# Patient Record
Sex: MALE | Race: WHITE | NOT HISPANIC OR LATINO | Employment: FULL TIME | URBAN - METROPOLITAN AREA
[De-identification: names, ages, dates, MRNs, and addresses within clinical notes are randomized per-mention and may not be internally consistent; named-entity substitution may affect disease eponyms.]

---

## 2017-01-11 ENCOUNTER — OFFICE VISIT (OUTPATIENT)
Dept: FAMILY MEDICINE | Facility: CLINIC | Age: 62
End: 2017-01-11
Payer: COMMERCIAL

## 2017-01-11 VITALS
DIASTOLIC BLOOD PRESSURE: 76 MMHG | HEIGHT: 72 IN | HEART RATE: 66 BPM | TEMPERATURE: 97.3 F | WEIGHT: 199 LBS | BODY MASS INDEX: 26.95 KG/M2 | OXYGEN SATURATION: 97 % | SYSTOLIC BLOOD PRESSURE: 117 MMHG

## 2017-01-11 DIAGNOSIS — I10 ESSENTIAL HYPERTENSION: ICD-10-CM

## 2017-01-11 DIAGNOSIS — K76.89 LIVER CYST: ICD-10-CM

## 2017-01-11 DIAGNOSIS — Z00.00 ENCOUNTER FOR ROUTINE ADULT HEALTH EXAMINATION WITHOUT ABNORMAL FINDINGS: ICD-10-CM

## 2017-01-11 DIAGNOSIS — E78.5 HYPERLIPIDEMIA LDL GOAL <130: ICD-10-CM

## 2017-01-11 DIAGNOSIS — Z12.11 COLON CANCER SCREENING: Primary | ICD-10-CM

## 2017-01-11 LAB
BASOPHILS # BLD AUTO: 0 10E9/L (ref 0–0.2)
BASOPHILS NFR BLD AUTO: 0.2 %
DIFFERENTIAL METHOD BLD: NORMAL
EOSINOPHIL # BLD AUTO: 0.1 10E9/L (ref 0–0.7)
EOSINOPHIL NFR BLD AUTO: 1.3 %
ERYTHROCYTE [DISTWIDTH] IN BLOOD BY AUTOMATED COUNT: 13.8 % (ref 10–15)
HCT VFR BLD AUTO: 48.1 % (ref 40–53)
HGB BLD-MCNC: 15.9 G/DL (ref 13.3–17.7)
LYMPHOCYTES # BLD AUTO: 1.6 10E9/L (ref 0.8–5.3)
LYMPHOCYTES NFR BLD AUTO: 27.6 %
MCH RBC QN AUTO: 31.2 PG (ref 26.5–33)
MCHC RBC AUTO-ENTMCNC: 33.1 G/DL (ref 31.5–36.5)
MCV RBC AUTO: 94 FL (ref 78–100)
MONOCYTES # BLD AUTO: 0.5 10E9/L (ref 0–1.3)
MONOCYTES NFR BLD AUTO: 8.9 %
NEUTROPHILS # BLD AUTO: 3.7 10E9/L (ref 1.6–8.3)
NEUTROPHILS NFR BLD AUTO: 62 %
PLATELET # BLD AUTO: 194 10E9/L (ref 150–450)
RBC # BLD AUTO: 5.1 10E12/L (ref 4.4–5.9)
WBC # BLD AUTO: 5.9 10E9/L (ref 4–11)

## 2017-01-11 PROCEDURE — 36415 COLL VENOUS BLD VENIPUNCTURE: CPT | Performed by: PREVENTIVE MEDICINE

## 2017-01-11 PROCEDURE — 80053 COMPREHEN METABOLIC PANEL: CPT | Performed by: PREVENTIVE MEDICINE

## 2017-01-11 PROCEDURE — G0103 PSA SCREENING: HCPCS | Performed by: PREVENTIVE MEDICINE

## 2017-01-11 PROCEDURE — 85025 COMPLETE CBC W/AUTO DIFF WBC: CPT | Performed by: PREVENTIVE MEDICINE

## 2017-01-11 PROCEDURE — 80061 LIPID PANEL: CPT | Mod: 90 | Performed by: PREVENTIVE MEDICINE

## 2017-01-11 PROCEDURE — 80061 LIPID PANEL: CPT | Mod: 59 | Performed by: PREVENTIVE MEDICINE

## 2017-01-11 PROCEDURE — 83704 LIPOPROTEIN BLD QUAN PART: CPT | Mod: 90 | Performed by: PREVENTIVE MEDICINE

## 2017-01-11 PROCEDURE — 99000 SPECIMEN HANDLING OFFICE-LAB: CPT | Performed by: PREVENTIVE MEDICINE

## 2017-01-11 PROCEDURE — 99214 OFFICE O/P EST MOD 30 MIN: CPT | Performed by: PREVENTIVE MEDICINE

## 2017-01-11 NOTE — NURSING NOTE
Chief Complaint   Patient presents with     Recheck Medication     last appt 11/12/15       Initial Pulse 66  Temp(Src) 97.3  F (36.3  C)  Ht 6' (1.829 m)  Wt 199 lb (90.266 kg)  BMI 26.98 kg/m2  SpO2 97% Estimated body mass index is 26.98 kg/(m^2) as calculated from the following:    Height as of this encounter: 6' (1.829 m).    Weight as of this encounter: 199 lb (90.266 kg).  BP completed using cuff size: barb NEFF, CMA

## 2017-01-11 NOTE — PROGRESS NOTES
SUBJECTIVE:  Scott Lynn, a 61 year old male scheduled an appointment to discuss the following issues:     Colon cancer screening  Liver cyst  Hyperlipidemia LDL goal <130  Essential hypertension  Encounter for routine adult health examination without abnormal findings  Pt here for follow up feels fine    Medical, social, surgical, and family histories reviewed.    ROS:  C: NEGATIVE for fever, chills  E: NEGATIVE for vision changes   R: NEGATIVE for significant cough or SOB  CV: NEGATIVE for chest pain, palpitations   GI: NEGATIVE for nausea, abdominal pain, heartburn, or change in bowel habits  : NEGATIVE for frequency, dysuria, or hematuria  M: NEGATIVE for significant arthralgias or myalgia  N: NEGATIVE for weakness, dizziness or paresthesias or headache    OBJECTIVE:  /76 mmHg  Pulse 66  Temp(Src) 97.3  F (36.3  C)  Ht 6' (1.829 m)  Wt 199 lb (90.266 kg)  BMI 26.98 kg/m2  SpO2 97%  EXAM:  GENERAL APPEARANCE: healthy, alert and no distress  EYES: EOMI,  PERRL  HENT: ear canals and TM's normal and nose and mouth without ulcers or lesions  RESP: lungs clear to auscultation - no rales, rhonchi or wheezes  CV: regular rates and rhythm, normal S1 S2, no S3 or S4 and no murmur, click or rub -  ABDOMEN:  soft, nontender, no HSM or masses and bowel sounds normal    ASSESSMENT/PLAN:  (Z12.11) Colon cancer screening  (primary encounter diagnosis)  Plan: GASTROENTEROLOGY ADULT REFERRAL +/- PROCEDURE    (K76.89) Liver cyst  Plan: MR Abdomen w/o & w Contrast  Will recheck with mri    (E78.5) Hyperlipidemia LDL goal <130  Plan: Lipid panel reflex to direct LDL, Lipoprotein         Particle NMR Profile  Will do nmr particle count to further risk stratify    (I10) Essential hypertension  Stable, continue lisinopril 10 mg daily    (Z00.00) Encounter for routine adult health examination without abnormal findings  Plan: CBC with platelets and differential,         Comprehensive metabolic panel, PSA, screen    Med  changes pending labs    25 minutes spent with patient, over 50% time counseling, coordinating care and explaining about nature of the patient's conditions.    All risks, benefits of treatment and further evaluation was reviewed with patient.  Pt expressed understanding.  Pt was in agreement with this plan.  Aashish Friedman MD

## 2017-01-11 NOTE — MR AVS SNAPSHOT
After Visit Summary   1/11/2017    Scott Lynn    MRN: [id number]           Patient Information     Date Of Birth          1955        Visit Information        Provider Department      1/11/2017 12:00 PM [facility] MD [facility]         Follow-ups after your visit        Who to contact     If you have questions or need follow up information about today's clinic visit or your schedule please contact [facility] directly at [phone].  Normal or non-critical lab and imaging results will be communicated to you by Tangible Playhart, letter or phone within 4 business days after the clinic has received the results. If you do not hear from us within 7 days, please contact the clinic through Tangible Playhart or phone. If you have a critical or abnormal lab result, we will notify you by phone as soon as possible.  Submit refill requests through Looker or call your pharmacy and they will forward the refill request to us. Please allow 3 business days for your refill to be completed.          Additional Information About Your Visit        MyChart Information     Looker gives you secure access to your electronic health record. If you see a primary care provider, you can also send messages to your care team and make appointments. If you have questions, please call your primary care clinic.  If you do not have a primary care provider, please call [phone] and they will assist you.        Care EveryWhere ID     This is your Care EveryWhere ID. This could be used by other organizations to access your [facility] medical records  [id number]        Your Vitals Were     Pulse Temperature Height BMI (Body Mass Index) Pulse Oximetry       66 97.3  F (36.3  C) 6' (1.829 m) 26.98 kg/m2 97%        Blood Pressure from Last 3 Encounters:   01/11/17 117/76   11/12/15 121/84   10/23/15 123/86    Weight from Last 3 Encounters:   01/11/17 199 lb (90.266 kg)   11/12/15 202 lb (91.627 kg)    10/23/15 202 lb 11.2 oz (91.944 kg)              Today, you had the following     No orders found for display       Primary Care Provider Office Phone # Fax #    Aashish Jimmy Friedman [phone]       [facility] [address] [facility] [address]  [facility] [address]        Thank you!     Thank you for choosing [facility]  for your care. Our goal is always to provide you with excellent care. Hearing back from our patients is one way we can continue to improve our services. Please take a few minutes to complete the written survey that you may receive in the mail after your visit with us. Thank you!             Your Updated Medication List - Protect others around you: Learn how to safely use, store and throw away your medicines at www.disposemymeds.org.          This list is accurate as of: 1/11/17 12:12 PM.  Always use your most recent med list.                   Brand Name Dispense Instructions for use    clotrimazole 1 % cream    LOTRIMIN    113 g    Apply topically 2 times daily       lisinopril 10 MG tablet    PRINIVIL/ZESTRIL    30 tablet    Take 1 tablet (10 mg) by mouth daily       Multi-vitamin Tabs tablet   Generic drug:  multivitamin, therapeutic with minerals      Take 1 tablet by mouth daily.       olopatadine HCl 0.2 % Soln    PATADAY    2.5 mL    Place 1 drop into both eyes daily       triamcinolone 0.1 % ointment    KENALOG    454 g    Apply topically 2 times daily

## 2017-01-12 LAB
ALBUMIN SERPL-MCNC: 3.9 G/DL (ref 3.4–5)
ALP SERPL-CCNC: 54 U/L (ref 40–150)
ALT SERPL W P-5'-P-CCNC: 38 U/L (ref 0–70)
ANION GAP SERPL CALCULATED.3IONS-SCNC: 6 MMOL/L (ref 3–14)
AST SERPL W P-5'-P-CCNC: 20 U/L (ref 0–45)
BILIRUB SERPL-MCNC: 0.8 MG/DL (ref 0.2–1.3)
BUN SERPL-MCNC: 19 MG/DL (ref 7–30)
CALCIUM SERPL-MCNC: 9.1 MG/DL (ref 8.5–10.1)
CHLORIDE SERPL-SCNC: 103 MMOL/L (ref 94–109)
CHOLEST SERPL-MCNC: 249 MG/DL
CO2 SERPL-SCNC: 30 MMOL/L (ref 20–32)
CREAT SERPL-MCNC: 1 MG/DL (ref 0.66–1.25)
GFR SERPL CREATININE-BSD FRML MDRD: 76 ML/MIN/1.7M2
GLUCOSE SERPL-MCNC: 95 MG/DL (ref 70–99)
HDLC SERPL-MCNC: 57 MG/DL
LDLC SERPL CALC-MCNC: 158 MG/DL
NONHDLC SERPL-MCNC: 192 MG/DL
POTASSIUM SERPL-SCNC: 4.1 MMOL/L (ref 3.4–5.3)
PROT SERPL-MCNC: 7.3 G/DL (ref 6.8–8.8)
PSA SERPL-ACNC: 0.26 UG/L (ref 0–4)
SODIUM SERPL-SCNC: 139 MMOL/L (ref 133–144)
TRIGL SERPL-MCNC: 169 MG/DL

## 2017-01-16 LAB
CHOLEST SERPL-MCNC: 255 MG/DL
HDL SERPL QN: 8.7 NM
HDLC SERPL-MCNC: 59 MG/DL
HLD.LARGE SERPL-SCNC: 38.3 UMOL/L
HLD.LARGE SERPL-SCNC: 4.7 UMOL/L
LDL MED SERPL QN: 21.5 NM
LDL SERPL QN: 21.5
LDL SERPL-SCNC: 2095 NMOL/L
LDL SMALL SERPL-SCNC: 630 NMOL/L
LDL SMALL SERPL-SCNC: 630 NMOL/L
LDLC SERPL CALC-MCNC: 163 MG/DL
LP-IR SCORE SERPL: 67
TRIGL SERPL-MCNC: 165 MG/DL
VLDL LARGE SERPL-SCNC: 6.7
VLDL SERPL QN: 54.6 NM

## 2017-01-17 ENCOUNTER — HOSPITAL ENCOUNTER (OUTPATIENT)
Dept: MRI IMAGING | Facility: CLINIC | Age: 62
Discharge: HOME | End: 2017-01-17
Attending: PREVENTIVE MEDICINE | Admitting: PREVENTIVE MEDICINE
Payer: COMMERCIAL

## 2017-01-17 DIAGNOSIS — K76.89 LIVER CYST: ICD-10-CM

## 2017-01-17 PROCEDURE — 25500045 ZZH RX 255: Performed by: PREVENTIVE MEDICINE

## 2017-01-17 PROCEDURE — 27210995 ZZH RX 272: Performed by: PREVENTIVE MEDICINE

## 2017-01-17 PROCEDURE — 74183 MRI ABD W/O CNTR FLWD CNTR: CPT

## 2017-01-17 PROCEDURE — 40000863 ZZH STATISTIC RADIOLOGY XRAY, US, CT, MAR, NM

## 2017-01-17 PROCEDURE — A9585 GADOBUTROL INJECTION: HCPCS | Performed by: PREVENTIVE MEDICINE

## 2017-01-17 RX ORDER — ACYCLOVIR 200 MG/1
30 CAPSULE ORAL ONCE
Status: COMPLETED | OUTPATIENT
Start: 2017-01-17 | End: 2017-01-17

## 2017-01-17 RX ORDER — GADOBUTROL 604.72 MG/ML
15 INJECTION INTRAVENOUS ONCE
Status: COMPLETED | OUTPATIENT
Start: 2017-01-17 | End: 2017-01-17

## 2017-01-17 RX ADMIN — SODIUM CHLORIDE 30 ML: 9 INJECTION, SOLUTION INTRAMUSCULAR; INTRAVENOUS; SUBCUTANEOUS at 09:02

## 2017-01-17 RX ADMIN — GADOBUTROL 15 ML: 604.72 INJECTION INTRAVENOUS at 09:02

## 2017-01-20 RX ORDER — ATORVASTATIN CALCIUM 10 MG/1
10 TABLET, FILM COATED ORAL DAILY
Qty: 90 TABLET | Refills: 1 | Status: SHIPPED | OUTPATIENT
Start: 2017-01-20 | End: 2017-07-03

## 2017-01-22 RX ORDER — LOSARTAN POTASSIUM 50 MG/1
50 TABLET ORAL DAILY
Qty: 90 TABLET | Refills: 1 | Status: SHIPPED | OUTPATIENT
Start: 2017-01-22 | End: 2017-06-30

## 2017-02-22 ENCOUNTER — MYC MEDICAL ADVICE (OUTPATIENT)
Dept: FAMILY MEDICINE | Facility: CLINIC | Age: 62
End: 2017-02-22

## 2017-06-30 ENCOUNTER — TELEPHONE (OUTPATIENT)
Dept: FAMILY MEDICINE | Facility: CLINIC | Age: 62
End: 2017-06-30

## 2017-06-30 DIAGNOSIS — I10 ESSENTIAL HYPERTENSION: ICD-10-CM

## 2017-06-30 NOTE — TELEPHONE ENCOUNTER
Pending Prescriptions:                       Disp   Refills    losartan (COZAAR) 50 MG tablet            90 tab*1            Sig: Take 1 tablet (50 mg) by mouth daily          Last Written Prescription Date: 1/22/17  Last Fill Quantity: 90, # refills: 1  Last Office Visit with [facility], [facility] or [facility] prescribing provider: 1/11/17 Elder       Potassium   Date Value Ref Range Status   01/11/2017 4.1 3.4 - 5.3 mmol/L Final     Creatinine   Date Value Ref Range Status   01/11/2017 1.00 0.66 - 1.25 mg/dL Final     BP Readings from Last 3 Encounters:   01/11/17 117/76   11/12/15 121/84   10/23/15 123/86     RT Monse(R)

## 2017-07-03 DIAGNOSIS — E78.5 HYPERLIPIDEMIA LDL GOAL <130: ICD-10-CM

## 2017-07-03 RX ORDER — LOSARTAN POTASSIUM 50 MG/1
TABLET ORAL
Qty: 90 TABLET | Refills: 0 | Status: SHIPPED | OUTPATIENT
Start: 2017-07-03 | End: 2017-12-20

## 2017-07-03 NOTE — TELEPHONE ENCOUNTER
Pending Prescriptions:                       Disp   Refills    atorvastatin (LIPITOR) 10 MG tablet       90 tab*1            Sig: Take 1 tablet (10 mg) by mouth daily         Last Written Prescription Date: 1/20/17  Last Fill Quantity: 90, # refills: 1  Last Office Visit with [facility], [facility] or [facility] prescribing provider: 1/11/17 Elder       Lab Results   Component Value Date    CHOL 249 01/11/2017     Lab Results   Component Value Date    HDL 57 01/11/2017     Lab Results   Component Value Date     01/11/2017     Lab Results   Component Value Date    TRIG 169 01/11/2017     Lab Results   Component Value Date    CHOLHDLRATIO 4.1 11/12/2015     Uyen Negrete, RT(R)

## 2017-07-05 RX ORDER — ATORVASTATIN CALCIUM 10 MG/1
10 TABLET, FILM COATED ORAL DAILY
Qty: 90 TABLET | Refills: 1 | Status: SHIPPED | OUTPATIENT
Start: 2017-07-05 | End: 2017-12-20

## 2017-08-30 DIAGNOSIS — I10 ESSENTIAL HYPERTENSION: ICD-10-CM

## 2017-08-31 RX ORDER — LOSARTAN POTASSIUM 50 MG/1
TABLET ORAL
Start: 2017-08-31

## 2017-08-31 NOTE — TELEPHONE ENCOUNTER
Denied  Refill request too early; Rx sent 7/3/2017 for 90 days  Paris CLEMENTS RN    Pending Prescriptions:                       Disp   Refills    losartan (COZAAR) 50 MG tablet [Pharmacy M*90 tab*0        Sig: TAKE 1 TABLET EVERY DAY

## 2017-10-27 ENCOUNTER — OFFICE VISIT (OUTPATIENT)
Dept: DERMATOLOGY | Facility: CLINIC | Age: 62
End: 2017-10-27

## 2017-10-27 DIAGNOSIS — L20.89 OTHER ATOPIC DERMATITIS: Primary | ICD-10-CM

## 2017-10-27 RX ORDER — TRIAMCINOLONE ACETONIDE 1 MG/G
OINTMENT TOPICAL 2 TIMES DAILY
Qty: 454 G | Refills: 1 | Status: SHIPPED | OUTPATIENT
Start: 2017-10-27 | End: 2020-02-26

## 2017-10-27 RX ORDER — BETAMETHASONE DIPROPIONATE 0.05 %
OINTMENT (GRAM) TOPICAL
Qty: 60 G | Refills: 1 | Status: SHIPPED | OUTPATIENT
Start: 2017-10-27

## 2017-10-27 ASSESSMENT — PAIN SCALES - GENERAL: PAINLEVEL: NO PAIN (0)

## 2017-10-27 NOTE — NURSING NOTE
Dermatology Rooming Note    Scott Lynn's goals for this visit include:   Chief Complaint   Patient presents with     Rash     Scott is here today for a rash follow up.      sinusitis

## 2017-10-27 NOTE — PROGRESS NOTES
"CHIEF COMPLAINT:     1.  Hands and legs still a little itchy but doing well for the most part and happy with therapy.     HISTORY OF PRESENT ILLNESS:  Mr. 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  He has been doing extremely well and is happy with treatment.  He's pretty good about using his CeraVe and less good with the TMC.  Hhis hands are his most itchy areas but even they are \"pretty good.\"  He struggles with moisturizing them as much as he should. In the past, he has had a combination of eczematous lesions and vesicles.  A biopsy performed revealed psoriasiform dermatitis and re-epithelializing subepidermal vesicular dermatitis, including possible eczematous dermatitis.  Direct immunofluorescence revealed intervascular deposition of C3 and fibrinogen likely related to inflammation, but nothing suggestive of an immunobullous disorder.  He has not been using the bleach baths lately, but states he believes they were helpful at the beginning.       MEDICATIONS:     1.  Triamcinolone .1% ointment  2.  Lisinopril.   3.  Clotrimazole cream p.r.n.   4.  Iopidine.   5.  MVI  6.  Olopatadine solution  7.  Losartan     ALLERGIES:  NKDA      OBJECTIVE:  Mr. Lynn is a very pleasant gentleman who is in no acute distress.  He has brown hair and Hemphill type II skin.  Areas examined were face, chest, abdomen, back, arms and legs.  He has no vesicles today.  Skin moisture level is good, though he has accentuation of skin markings consistent with xerosis on forearms and legs.  He has a scaling patches on both dorsal hands L>R. He has a faint pink poorly demarcated, eczematous-appearing patch just proximal to his left ankle.  He has no vesicles and no bulla.  He has no evidence of steroid induced atrophy.        ASSESSMENT:  Eczematous dermatitis, currently doing very well.      PLAN:  I congratulated Mr. Lynn on his good skin care.  I think he is doing " very well but could do a bit better with hand moisturizer.  I specifically recommended Vanicream at all sinks and at his desk. We discussed overuse of topical corticosteroids, which he is certainly not at risk for right now with his intermittent use. I refilled his TMC but after discussing his hands, elected to add Betamethasone diproprionate ointment used 2-3 times weekly for these stubborn areas.  If he struggles with flares this fall (a tough time for him) he should get more aggressive with his bleach baths and triamcinolone ointment, and I would expect his skin to calm.    I remain comfortable following him once a year with the understanding that if he has exacerbation prior to that date that he is not getting good control with the current regimen, I would be happy to see him back.     Yoly Obregon MD  Clinical Professor   Department of Dermatology  [facility]

## 2017-10-27 NOTE — MR AVS SNAPSHOT
After Visit Summary   10/27/2017    Scott Lynn    MRN: [id number]           Patient Information     Date Of Birth          1955        Visit Information        Provider Department      10/27/2017 9:15 AM Yoly Obregon MD [facility]        Today's Diagnoses     Other atopic dermatitis    -  1       Follow-ups after your visit        Follow-up notes from your care team     Return in about 1 year (around 10/27/2018).      Who to contact     Please call your clinic at [phone] to:    Ask questions about your health    Make or cancel appointments    Discuss your medicines    Learn about your test results    Speak to your doctor   If you have compliments or concerns about an experience at your clinic, or if you wish to file a complaint, please contact [facility] Patient Relations at [phone] or email us at [email or web address]         Additional Information About Your Visit        MyChart Information     Kobot gives you secure access to your electronic health record. If you see a primary care provider, you can also send messages to your care team and make appointments. If you have questions, please call your primary care clinic.  If you do not have a primary care provider, please call [phone] and they will assist you.      EyeSee360 is an electronic gateway that provides easy, online access to your medical records. With EyeSee360, you can request a clinic appointment, read your test results, renew a prescription or communicate with your care team.     To access your existing account, please contact your [facility] or call [phone] for assistance.        Care EveryWhere ID     This is your Care EveryWhere ID. This could be used by other organizations to access your [facility] medical records  [id number]         Blood Pressure from Last 3 Encounters:   01/11/17 117/76   11/12/15 121/84   10/23/15 123/86     Weight from Last 3 Encounters:   01/11/17 90.3 kg (199 lb)   11/12/15 91.6 kg (202 lb)   10/23/15 91.9 kg (202 lb 11.2 oz)              Today, you had the following     No orders found for display         Today's Medication Changes          These changes are accurate as of: 10/27/17  1:52 PM.  If you have any questions, ask your nurse or doctor.               Start taking these medicines.        Dose/Directions    betamethasone dipropionate 0.05 % ointment   Commonly known as:  DIPROSONE   Started by:  Yoly Obregon MD        Apply to stubborn eczema on hands 2 to 3 times weekly as needed   Quantity:  60 g   Refills:  1            Where to get your medicines      These medications were sent to [facility] [address]  [place], [place] - [address] [facility]  [place] [address]     Phone:  [phone]     betamethasone dipropionate 0.05 % ointment    triamcinolone 0.1 % ointment                Primary Care Provider Office Phone # Fax #    Xzqwuuun Jimmy Timo Friedman [phone] [address]  [facility] [facility] [address]        Equal Access to Services     JOHNY GOTTLIEB AH: Hadii vicky ku hadasho Soomaali, waaxda luqadaha, qaybta kaalmada adeegyada, waxay idiin hayanalilia handy. So [facility] [phone].    ATENCIÓN: Si habla español, tiene a can disposición servicios gratuitos de asistencia lingüística. Llame al [phone].    We comply with applicable federal civil rights laws and [place] laws. We do not discriminate on the basis of race, color, national origin, age, disability, sex, sexual orientation, or gender identity.            Thank you!     Thank you for choosing [facility]  for your care. Our goal is always to provide you with excellent care. Hearing back from our patients is one way we can continue to improve our services. Please take a few minutes to complete the written survey that you may receive in the mail  after your visit with us. Thank you!             Your Updated Medication List - Protect others around you: Learn how to safely use, store and throw away your medicines at www.disposemymeds.org.          This list is accurate as of: 10/27/17  1:52 PM.  Always use your most recent med list.                   Brand Name Dispense Instructions for use Diagnosis    atorvastatin 10 MG tablet    LIPITOR    90 tablet    Take 1 tablet (10 mg) by mouth daily    Hyperlipidemia LDL goal <130       betamethasone dipropionate 0.05 % ointment    DIPROSONE    60 g    Apply to stubborn eczema on hands 2 to 3 times weekly as needed        clotrimazole 1 % cream    LOTRIMIN    113 g    Apply topically 2 times daily    Skin plaque       lisinopril 10 MG tablet    PRINIVIL/ZESTRIL    30 tablet    Take 1 tablet (10 mg) by mouth daily    Essential hypertension       losartan 50 MG tablet    COZAAR    90 tablet    Take 1 tablet (50 mg) by mouth daily. Patient needs to establish care with new provider. Please call [phone].    Essential hypertension       Multi-vitamin Tabs tablet   Generic drug:  multivitamin, therapeutic with minerals      Take 1 tablet by mouth daily.        olopatadine HCl 0.2 % Soln    PATADAY    2.5 mL    Place 1 drop into both eyes daily    Allergic conjunctivitis, bilateral       triamcinolone 0.1 % ointment    KENALOG    454 g    Apply topically 2 times daily

## 2017-10-27 NOTE — LETTER
"10/27/2017       RE: Scott Lynn  [address] [address]     Dear Colleague,    Thank you for referring your patient, Scott Lynn, to the [facility] at [facility]. Please see a copy of my visit note below.    CHIEF COMPLAINT:     1.  Hands and legs still a little itchy but doing well for the most part and happy with therapy.     HISTORY OF PRESENT ILLNESS:  Mr. 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  He has been doing extremely well and is happy with treatment.  He's pretty good about using his CeraVe and less good with the TMC.  Hhis hands are his most itchy areas but even they are \"pretty good.\"  He struggles with moisturizing them as much as he should. In the past, he has had a combination of eczematous lesions and vesicles.  A biopsy performed revealed psoriasiform dermatitis and re-epithelializing subepidermal vesicular dermatitis, including possible eczematous dermatitis.  Direct immunofluorescence revealed intervascular deposition of C3 and fibrinogen likely related to inflammation, but nothing suggestive of an immunobullous disorder.  He has not been using the bleach baths lately, but states he believes they were helpful at the beginning.       MEDICATIONS:     1.  Triamcinolone .1% ointment  2.  Lisinopril.   3.  Clotrimazole cream p.r.n.   4.  Iopidine.   5.  MVI  6.  Olopatadine solution  7.  Losartan     ALLERGIES:  NKDA      OBJECTIVE:  Mr. Lynn is a very pleasant gentleman who is in no acute distress.  He has brown hair and Hemphill type II skin.  Areas examined were face, chest, abdomen, back, arms and legs.  He has no vesicles today.  Skin moisture level is good, though he has accentuation of skin markings consistent with xerosis on forearms and legs.  He has a scaling patches on both dorsal hands L>R. He has a faint pink poorly demarcated, " eczematous-appearing patch just proximal to his left ankle.  He has no vesicles and no bulla.  He has no evidence of steroid induced atrophy.        ASSESSMENT:  Eczematous dermatitis, currently doing very well.      PLAN:  I congratulated Mr. Lynn on his good skin care.  I think he is doing very well but could do a bit better with hand moisturizer.  I specifically recommended Vanicream at all sinks and at his desk. We discussed overuse of topical corticosteroids, which he is certainly not at risk for right now with his intermittent use. I refilled his TMC but after discussing his hands, elected to add Betamethasone diproprionate ointment used 2-3 times weekly for these stubborn areas.  If he struggles with flares this fall (a tough time for him) he should get more aggressive with his bleach baths and triamcinolone ointment, and I would expect his skin to calm.    I remain comfortable following him once a year with the understanding that if he has exacerbation prior to that date that he is not getting good control with the current regimen, I would be happy to see him back.     Yoly Obregon MD  Clinical Professor   Department of Dermatology  [facility]

## 2017-12-20 ENCOUNTER — OFFICE VISIT (OUTPATIENT)
Dept: FAMILY MEDICINE | Facility: CLINIC | Age: 62
End: 2017-12-20
Payer: COMMERCIAL

## 2017-12-20 VITALS
WEIGHT: 206 LBS | TEMPERATURE: 98.8 F | HEIGHT: 72 IN | OXYGEN SATURATION: 97 % | BODY MASS INDEX: 27.9 KG/M2 | DIASTOLIC BLOOD PRESSURE: 86 MMHG | SYSTOLIC BLOOD PRESSURE: 135 MMHG | HEART RATE: 70 BPM

## 2017-12-20 DIAGNOSIS — Z12.11 SCREENING FOR COLON CANCER: ICD-10-CM

## 2017-12-20 DIAGNOSIS — Z12.5 SCREENING FOR PROSTATE CANCER: ICD-10-CM

## 2017-12-20 DIAGNOSIS — E78.5 HYPERLIPIDEMIA LDL GOAL <130: ICD-10-CM

## 2017-12-20 DIAGNOSIS — Z23 NEED FOR PROPHYLACTIC VACCINATION AND INOCULATION AGAINST INFLUENZA: ICD-10-CM

## 2017-12-20 DIAGNOSIS — I10 ESSENTIAL HYPERTENSION: ICD-10-CM

## 2017-12-20 DIAGNOSIS — Z00.00 ROUTINE GENERAL MEDICAL EXAMINATION AT A HEALTH CARE FACILITY: Primary | ICD-10-CM

## 2017-12-20 PROBLEM — L20.84 INTRINSIC ECZEMA: Status: ACTIVE | Noted: 2017-12-20

## 2017-12-20 LAB
ERYTHROCYTE [DISTWIDTH] IN BLOOD BY AUTOMATED COUNT: 13.7 % (ref 10–15)
HCT VFR BLD AUTO: 45.9 % (ref 40–53)
HGB BLD-MCNC: 15.6 G/DL (ref 13.3–17.7)
MCH RBC QN AUTO: 32.2 PG (ref 26.5–33)
MCHC RBC AUTO-ENTMCNC: 34 G/DL (ref 31.5–36.5)
MCV RBC AUTO: 95 FL (ref 78–100)
PLATELET # BLD AUTO: 177 10E9/L (ref 150–450)
RBC # BLD AUTO: 4.84 10E12/L (ref 4.4–5.9)
WBC # BLD AUTO: 5.6 10E9/L (ref 4–11)

## 2017-12-20 PROCEDURE — 36415 COLL VENOUS BLD VENIPUNCTURE: CPT | Performed by: INTERNAL MEDICINE

## 2017-12-20 PROCEDURE — 85027 COMPLETE CBC AUTOMATED: CPT | Performed by: INTERNAL MEDICINE

## 2017-12-20 PROCEDURE — 99396 PREV VISIT EST AGE 40-64: CPT | Performed by: INTERNAL MEDICINE

## 2017-12-20 PROCEDURE — G0103 PSA SCREENING: HCPCS | Performed by: INTERNAL MEDICINE

## 2017-12-20 PROCEDURE — 80061 LIPID PANEL: CPT | Performed by: INTERNAL MEDICINE

## 2017-12-20 PROCEDURE — 80053 COMPREHEN METABOLIC PANEL: CPT | Performed by: INTERNAL MEDICINE

## 2017-12-20 RX ORDER — LOSARTAN POTASSIUM 50 MG/1
TABLET ORAL
Qty: 90 TABLET | Refills: 3 | Status: SHIPPED | OUTPATIENT
Start: 2017-12-20 | End: 2019-01-07

## 2017-12-20 RX ORDER — ATORVASTATIN CALCIUM 10 MG/1
10 TABLET, FILM COATED ORAL DAILY
Qty: 90 TABLET | Refills: 3 | Status: SHIPPED | OUTPATIENT
Start: 2017-12-20 | End: 2019-01-07

## 2017-12-20 NOTE — NURSING NOTE
Chief Complaint   Patient presents with     Physical       Initial /86 (BP Location: Left arm, Patient Position: Chair, Cuff Size: Adult Regular)  Pulse 70  Temp 98.8  F (37.1  C) (Oral)  Ht 6' (1.829 m)  Wt 206 lb (93.4 kg)  SpO2 97%  BMI 27.94 kg/m2 Estimated body mass index is 27.94 kg/(m^2) as calculated from the following:    Height as of this encounter: 6' (1.829 m).    Weight as of this encounter: 206 lb (93.4 kg).  Medication Reconciliation: complete   Kayy Chiu MA

## 2017-12-20 NOTE — MR AVS SNAPSHOT
After Visit Summary   12/20/2017    Scott Lynn    MRN: [id number]           Patient Information     Date Of Birth          1955        Visit Information        Provider Department      12/20/2017 4:30 PM Jose L Eason MD [facility]        Today's Diagnoses     Routine general medical examination at a health care facility    -  1    Essential hypertension        Hyperlipidemia LDL goal <130        Screening for prostate cancer        Screening for colon cancer        Need for prophylactic vaccination and inoculation against influenza          Care Instructions      Preventive Health Recommendations  Male Ages 50 - 64    Yearly exam:             See your health care provider every year in order to  o   Review health changes.   o   Discuss preventive care.    o   Review your medicines if your doctor has prescribed any.     Have a cholesterol test every 5 years, or more frequently if you are at risk for high cholesterol/heart disease.     Have a diabetes test (fasting glucose) every three years. If you are at risk for diabetes, you should have this test more often.     Have a colonoscopy at age 50, or have a yearly FIT test (stool test). These exams will check for colon cancer.      Talk with your health care provider about whether or not a prostate cancer screening test (PSA) is right for you.    You should be tested each year for STDs (sexually transmitted diseases), if you re at risk.     Shots: Get a flu shot each year. Get a tetanus shot every 10 years.     Nutrition:    Eat at least 5 servings of fruits and vegetables daily.     Eat whole-grain bread, whole-wheat pasta and brown rice instead of white grains and rice.     Talk to your provider about Calcium and Vitamin D.     Lifestyle    Exercise for at least 150 minutes a week (30 minutes a day, 5 days a week). This will help you control your weight and prevent disease.     Limit alcohol to one drink per day.     No  smoking.     Wear sunscreen to prevent skin cancer.     See your dentist every six months for an exam and cleaning.     See your eye doctor every 1 to 2 years.            Follow-ups after your visit        Additional Services     GASTROENTEROLOGY ADULT REF PROCEDURE ONLY       Last Lab Result: Creatinine (mg/dL)       Date                     Value                 01/11/2017               1.00             ----------  Body mass index is 27.94 kg/(m^2).     Needed:  No  Language:  English    Patient will be contacted to schedule procedure.     Please be aware that coverage of these services is subject to the terms and limitations of your health insurance plan.  Call member services at your health plan with any benefit or coverage questions.  Any procedures must be performed at a [place] facility OR coordinated by your clinic's referral office.    Please bring the following with you to your appointment:    (1) Any X-Rays, CTs or MRIs which have been performed.  Contact the facility where they were done to arrange for  prior to your scheduled appointment.    (2) List of current medications   (3) This referral request   (4) Any documents/labs given to you for this referral                  Who to contact     If you have questions or need follow up information about today's clinic visit or your schedule please contact [facility] directly at [phone].  Normal or non-critical lab and imaging results will be communicated to you by MyChart, letter or phone within 4 business days after the clinic has received the results. If you do not hear from us within 7 days, please contact the clinic through O4IThart or phone. If you have a critical or abnormal lab result, we will notify you by phone as soon as possible.  Submit refill requests through De[employer]r or call your pharmacy and they will forward the refill request to us. Please allow 3 business days for your refill to be completed.           Additional Information About Your Visit        Shippoh[employer] Information     [employer] gives you secure access to your electronic health record. If you see a primary care provider, you can also send messages to your care team and make appointments. If you have questions, please call your primary care clinic.  If you do not have a primary care provider, please call [phone] and they will assist you.        Care EveryWhere ID     This is your Care EveryWhere ID. This could be used by other organizations to access your [place] medical records  [id number]        Your Vitals Were     Pulse Temperature Height Pulse Oximetry BMI (Body Mass Index)       70 98.8  F (37.1  C) (Oral) 6' (1.829 m) 97% 27.94 kg/m2        Blood Pressure from Last 3 Encounters:   12/20/17 135/86   01/11/17 117/76   11/12/15 121/84    Weight from Last 3 Encounters:   12/20/17 206 lb (93.4 kg)   01/11/17 199 lb (90.3 kg)   11/12/15 202 lb (91.6 kg)              We Performed the Following     CBC with platelets     Comprehensive metabolic panel     GASTROENTEROLOGY ADULT REF PROCEDURE ONLY     Lipid panel reflex to direct LDL Non-fasting     Prostate spec antigen screen          Today's Medication Changes          These changes are accurate as of: 12/20/17  5:46 PM.  If you have any questions, ask your nurse or doctor.               Stop taking these medicines if you haven't already. Please contact your care team if you have questions.     lisinopril 10 MG tablet   Commonly known as:  PRINIVIL/ZESTRIL   Stopped by:  Jose L Eason MD                Where to get your medicines      These medications were sent to [id number]  [address] AT [facility] [place] [address] [address]     Phone:  [phone]     atorvastatin 10 MG tablet    losartan 50 MG tablet                Primary Care Provider Office Phone # Fax #    Aashish Marquez Timo Friedman [phone] [facility] [address]  [facility] [address]        Equal Access to Services     JOHNY GOTTLIEB : Hadii vicky ku hadcoydarren [place], waaxda luqadaha, qaybta kasydnieda araceligorgefarhad, kanwal martinezpaulaixa handy. So [facility] [phone].    ATENCIÓN: Si habla español, tiene a can disposición servicios gratuitos de asistencia lingüística. Llame al [phone].    We comply with applicable federal civil rights laws and [place] laws. We do not discriminate on the basis of race, color, national origin, age, disability, sex, sexual orientation, or gender identity.            Thank you!     Thank you for choosing [facility]  for your care. Our goal is always to provide you with excellent care. Hearing back from our patients is one way we can continue to improve our services. Please take a few minutes to complete the written survey that you may receive in the mail after your visit with us. Thank you!             Your Updated Medication List - Protect others around you: Learn how to safely use, store and throw away your medicines at www.disposemymeds.org.          This list is accurate as of: 12/20/17  5:46 PM.  Always use your most recent med list.                   Brand Name Dispense Instructions for use Diagnosis    atorvastatin 10 MG tablet    LIPITOR    90 tablet    Take 1 tablet (10 mg) by mouth daily    Hyperlipidemia LDL goal <130       betamethasone dipropionate 0.05 % ointment    DIPROSONE    60 g    Apply to stubborn eczema on hands 2 to 3 times weekly as needed        clotrimazole 1 % cream    LOTRIMIN    113 g    Apply topically 2 times daily    Skin plaque       losartan 50 MG tablet    COZAAR    90 tablet    Take 1 tablet (50 mg) by mouth daily. Patient needs to establish care with new provider. Please call [phone].    Essential hypertension       Multi-vitamin Tabs tablet   Generic drug:  multivitamin, therapeutic with minerals      Take 1 tablet by mouth daily.        olopatadine HCl 0.2 % Soln     PATADAY    2.5 mL    Place 1 drop into both eyes daily    Allergic conjunctivitis, bilateral       triamcinolone 0.1 % ointment    KENALOG    454 g    Apply topically 2 times daily

## 2017-12-20 NOTE — PROGRESS NOTES
SUBJECTIVE:   CC: Scott Lynn is an 62 year old male who presents for preventative health visit.     Healthy Habits:    Do you get at least three servings of calcium containing foods daily (dairy, green leafy vegetables, etc.)? yes    Amount of exercise or daily activities, outside of work: 7 day(s) per week, walks dog    Problems taking medications regularly No    Medication side effects: No    Have you had an eye exam in the past two years? no    Do you see a dentist twice per year? yes    Do you have sleep apnea, excessive snoring or daytime drowsiness?no       Today's PHQ-2 Score:   PHQ-2 ( 1999 Pfizer) 12/20/2017 1/11/2017   Q1: Little interest or pleasure in doing things 0 0   Q2: Feeling down, depressed or hopeless 0 0   PHQ-2 Score 0 0   Q1: Little interest or pleasure in doing things - -   Q2: Feeling down, depressed or hopeless - -   PHQ-2 Score - -         Abuse: Current or Past(Physical, Sexual or Emotional)- no   Do you feel safe in your environment - Yes  Social History   Substance Use Topics     Smoking status: Never Smoker     Smokeless tobacco: Never Used     Alcohol use 0.0 oz/week     0 Standard drinks or equivalent per week      Comment: 4-5 drinks per week      If you drink alcohol do you typically have >3 drinks per day or >7 drinks per week? No                      Last PSA:   PSA   Date Value Ref Range Status   01/11/2017 0.26 0 - 4 ug/L Final     Comment:     Assay Method:  Chemiluminescence using Siemens Vista analyzer       Reviewed orders with patient. Reviewed health maintenance and updated orders accordingly - Yes  Patient Active Problem List   Diagnosis     HTN (hypertension)     Advanced directives, counseling/discussion     Hyperlipidemia LDL goal <130     Cervical radiculopathy     Neck pain     Intrinsic eczema     History reviewed. No pertinent surgical history.    Social History   Substance Use Topics     Smoking status: Never Smoker     Smokeless tobacco: Never Used      Alcohol use 0.0 oz/week     0 Standard drinks or equivalent per week      Comment: 4-5 drinks per week     Family History   Problem Relation Age of Onset     Allergies Father       in 30s     Parkinsonism Mother      DIABETES No family hx of      Cancer - colorectal No family hx of      Prostate Cancer No family hx of      Skin Cancer No family hx of      No known family hx of skin cancer     Melanoma No family hx of          Current Outpatient Prescriptions   Medication Sig Dispense Refill     betamethasone dipropionate (DIPROSONE) 0.05 % ointment Apply to stubborn eczema on hands 2 to 3 times weekly as needed 60 g 1     triamcinolone (KENALOG) 0.1 % ointment Apply topically 2 times daily 454 g 1     atorvastatin (LIPITOR) 10 MG tablet Take 1 tablet (10 mg) by mouth daily 90 tablet 1     losartan (COZAAR) 50 MG tablet Take 1 tablet (50 mg) by mouth daily. Patient needs to establish care with new provider. Please call [phone]. 90 tablet 0     clotrimazole (LOTRIMIN) 1 % cream Apply topically 2 times daily 113 g 1     olopatadine HCl (PATADAY) 0.2 % SOLN Place 1 drop into both eyes daily 2.5 mL 3     Multiple Vitamin (MULTI-VITAMIN) per tablet Take 1 tablet by mouth daily.       No Known Allergies      Reviewed and updated as needed this visit by clinical staffTobacco  Allergies  Meds  Med Hx  Surg Hx  Fam Hx  Soc Hx        Reviewed and updated as needed this visit by Provider  Tobacco  Med Hx  Surg Hx  Fam Hx  Soc Hx       Past Medical History:   Diagnosis Date     HTN (hypertension) 2012     Hyperlipidemia LDL goal <130 2012     Intrinsic eczema 2017      History reviewed. No pertinent surgical history.    ROS:  A 12 organ systems ROS is negative.     OBJECTIVE:   /86 (BP Location: Left arm, Patient Position: Chair, Cuff Size: Adult Regular)  Pulse 70  Temp 98.8  F (37.1  C) (Oral)  Ht 6' (1.829 m)  Wt 206 lb (93.4 kg)  SpO2 97%  BMI 27.94 kg/m2  EXAM:  GENERAL:  healthy, alert and no distress  EYES: Eyes grossly normal to inspection, PERRL and conjunctivae and sclerae normal  HENT: ear canals and TM's normal, nose and mouth without ulcers or lesions  NECK: no adenopathy, no asymmetry, masses, or scars and thyroid normal to palpation  RESP: lungs clear to auscultation - no rales, rhonchi or wheezes  CV: regular rate and rhythm, normal S1 S2, no S3 or S4, no murmur, click or rub, no peripheral edema and peripheral pulses strong  ABDOMEN: soft, nontender, no hepatosplenomegaly, no masses and bowel sounds normal  RECTAL: normal sphincter tone, no rectal masses, prostate normal size, smooth, nontender without nodules or masses  MS: no gross musculoskeletal defects noted, no edema  SKIN: no suspicious lesions or rashes  NEURO: Normal strength and tone, mentation intact and speech normal  PSYCH: mentation appears normal, affect normal/bright    ASSESSMENT/PLAN:   1. Routine general medical examination at a health care facility      2. Essential hypertension  Under good control  - losartan (COZAAR) 50 MG tablet; Take 1 tablet (50 mg) by mouth daily. Patient needs to establish care with new provider. Please call [phone].  Dispense: 90 tablet; Refill: 3    3. Hyperlipidemia LDL goal <130  On statin therapy  - Comprehensive metabolic panel  - CBC with platelets  - Lipid panel reflex to direct LDL Non-fasting  - atorvastatin (LIPITOR) 10 MG tablet; Take 1 tablet (10 mg) by mouth daily  Dispense: 90 tablet; Refill: 3    4. Screening for prostate cancer    - Prostate spec antigen screen    5. Screening for colon cancer  Due for colonoscopy for screening purposes, discussed with patient, referral sent  - GASTROENTEROLOGY ADULT REF PROCEDURE ONLY    6. Need for prophylactic vaccination and inoculation against influenza  Patient declined recommendation for flu vaccination today      COUNSELING:  Reviewed preventive health counseling, as reflected in patient instructions  Special  attention given to:        Regular exercise       Healthy diet/nutrition       Immunizations    Declined: Influenza due to Concerns about side effects/safety             Consider Hep C screening for patients born between 1945 and 1965       Colon cancer screening       Prostate cancer screening         reports that he has never smoked. He has never used smokeless tobacco.      Estimated body mass index is 27.94 kg/(m^2) as calculated from the following:    Height as of this encounter: 6' (1.829 m).    Weight as of this encounter: 206 lb (93.4 kg).   Weight management plan: Discussed healthy diet and exercise guidelines and patient will follow up in 12 months in clinic to re-evaluate.    Counseling Resources:  ATP IV Guidelines  Pooled Cohorts Equation Calculator  FRAX Risk Assessment  ICSI Preventive Guidelines  Dietary Guidelines for Americans, 2010  USDA's MyPlate  ASA Prophylaxis  Lung CA Screening    Jose L Eason MD  [facility]

## 2017-12-21 LAB
ALBUMIN SERPL-MCNC: 4 G/DL (ref 3.4–5)
ALP SERPL-CCNC: 50 U/L (ref 40–150)
ALT SERPL W P-5'-P-CCNC: 37 U/L (ref 0–70)
ANION GAP SERPL CALCULATED.3IONS-SCNC: 9 MMOL/L (ref 3–14)
AST SERPL W P-5'-P-CCNC: 24 U/L (ref 0–45)
BILIRUB SERPL-MCNC: 0.6 MG/DL (ref 0.2–1.3)
BUN SERPL-MCNC: 20 MG/DL (ref 7–30)
CALCIUM SERPL-MCNC: 9.2 MG/DL (ref 8.5–10.1)
CHLORIDE SERPL-SCNC: 103 MMOL/L (ref 94–109)
CHOLEST SERPL-MCNC: 176 MG/DL
CO2 SERPL-SCNC: 26 MMOL/L (ref 20–32)
CREAT SERPL-MCNC: 1.03 MG/DL (ref 0.66–1.25)
GFR SERPL CREATININE-BSD FRML MDRD: 74 ML/MIN/1.7M2
GLUCOSE SERPL-MCNC: 81 MG/DL (ref 70–99)
HDLC SERPL-MCNC: 60 MG/DL
LDLC SERPL CALC-MCNC: 92 MG/DL
NONHDLC SERPL-MCNC: 116 MG/DL
POTASSIUM SERPL-SCNC: 4.5 MMOL/L (ref 3.4–5.3)
PROT SERPL-MCNC: 7.1 G/DL (ref 6.8–8.8)
PSA SERPL-ACNC: 0.3 UG/L (ref 0–4)
SODIUM SERPL-SCNC: 138 MMOL/L (ref 133–144)
TRIGL SERPL-MCNC: 122 MG/DL

## 2017-12-23 NOTE — PROGRESS NOTES
The following letter pertains to your most recent diagnostic tests:    -Liver and gallbladder tests are normal for you. (ALT,AST, Alk phos, bilirubin), kidney function is normal for you (Creatinine, GFR), Sodium is normal, Potassium is normal for you, Calcium is normal for you, Glucose (blood sugar) is normal for you.      -Your cholesterol panel looks healthy.     -Your prostate specific antigen (PSA) test result returned normal.     -Your complete blood counts including your hemoglobin returned normal for you.             Bottom line:  Your blood test results look healthy and at goal       Follow up:  Schedule an appointment for a physical examination with fasting blood tests in one year's time, or return sooner if new questions, symptoms or problems arise.       Sincerely,    Dr. Eason

## 2019-01-07 DIAGNOSIS — I10 ESSENTIAL HYPERTENSION: ICD-10-CM

## 2019-01-07 DIAGNOSIS — E78.5 HYPERLIPIDEMIA LDL GOAL <130: ICD-10-CM

## 2019-01-08 NOTE — TELEPHONE ENCOUNTER
"losartan (COZAAR) 50 MG tablet 90 tablet 3 12/20/2017     atorvastatin (LIPITOR) 10 MG tablet 90 tablet 3 12/20/2017         Last Written Prescription Date:  12/20/2017  Last Fill Quantity: 90,  # refills: 3   Last office visit: 12/20/2017 with prescribing provider:     Future Office Visit: Called pt and scheduled an appointment for a physical on  01/24/2019    Requested Prescriptions   Pending Prescriptions Disp Refills     atorvastatin (LIPITOR) 10 MG tablet [Pharmacy Med Name: ATORVASTATIN 10MG TABLETS] 90 tablet 0     Sig: TAKE 1 TABLET(10 MG) BY MOUTH DAILY    Statins Protocol Failed - 1/7/2019  1:02 PM       Failed - LDL on file in past 12 months    Recent Labs   Lab Test 12/20/17  1727   LDL 92            Failed - Recent (12 mo) or future (30 days) visit within the authorizing provider's specialty    Patient had office visit in the last 12 months or has a visit in the next 30 days with authorizing provider or within the authorizing provider's specialty.  See \"Patient Info\" tab in in[employer]line Therapeuticset, or \"Choose Columns\" in Meds & Orders section of the refill encounter.             Passed - No abnormal creatine kinase in past 12 months    No lab results found.            Passed - Medication is active on med list       Passed - Patient is age 18 or older        losartan (COZAAR) 50 MG tablet [Pharmacy Med Name: LOSARTAN 50MG TABLETS] 90 tablet 0     Sig: TAKE 1 TABLET(50 MG) BY MOUTH DAILY    Angiotensin-II Receptors Failed - 1/7/2019  1:02 PM       Failed - Blood pressure under 140/90 in past 12 months    BP Readings from Last 3 Encounters:   12/20/17 135/86   01/11/17 117/76   11/12/15 121/84                Failed - Recent (12 mo) or future (30 days) visit within the authorizing provider's specialty    Patient had office visit in the last 12 months or has a visit in the next 30 days with authorizing provider or within the authorizing provider's specialty.  See \"Patient Info\" tab in in[employer]ger Mapssket, or \"Choose Columns\" " in Meds & Orders section of the refill encounter.             Failed - Normal serum creatinine on file in past 12 months    Recent Labs   Lab Test 12/20/17  1727   CR 1.03            Failed - Normal serum potassium on file in past 12 months    Recent Labs   Lab Test 12/20/17  1727   POTASSIUM 4.5                   Passed - Medication is active on med list       Passed - Patient is age 18 or older

## 2019-01-09 RX ORDER — LOSARTAN POTASSIUM 50 MG/1
TABLET ORAL
Qty: 30 TABLET | Refills: 0 | Status: SHIPPED | OUTPATIENT
Start: 2019-01-09 | End: 2019-02-09

## 2019-01-09 RX ORDER — ATORVASTATIN CALCIUM 10 MG/1
TABLET, FILM COATED ORAL
Qty: 30 TABLET | Refills: 0 | Status: SHIPPED | OUTPATIENT
Start: 2019-01-09 | End: 2019-02-09

## 2019-01-09 NOTE — TELEPHONE ENCOUNTER
Medication is being filled for 1 time refill only due to:  Patient needs to be seen because it has been more than one year since last visit. Pt is scheduled 1/24/19.  Malika Foley RN

## 2019-01-24 ENCOUNTER — OFFICE VISIT (OUTPATIENT)
Dept: FAMILY MEDICINE | Facility: CLINIC | Age: 64
End: 2019-01-24
Payer: COMMERCIAL

## 2019-01-24 VITALS
DIASTOLIC BLOOD PRESSURE: 74 MMHG | HEART RATE: 68 BPM | TEMPERATURE: 97 F | SYSTOLIC BLOOD PRESSURE: 122 MMHG | BODY MASS INDEX: 27.8 KG/M2 | OXYGEN SATURATION: 97 % | WEIGHT: 205 LBS

## 2019-01-24 DIAGNOSIS — Z12.5 PROSTATE CANCER SCREENING: ICD-10-CM

## 2019-01-24 DIAGNOSIS — Z00.00 ROUTINE GENERAL MEDICAL EXAMINATION AT A HEALTH CARE FACILITY: Primary | ICD-10-CM

## 2019-01-24 DIAGNOSIS — E78.5 HYPERLIPIDEMIA LDL GOAL <100: ICD-10-CM

## 2019-01-24 DIAGNOSIS — I10 ESSENTIAL HYPERTENSION: ICD-10-CM

## 2019-01-24 LAB
ERYTHROCYTE [DISTWIDTH] IN BLOOD BY AUTOMATED COUNT: 14.2 % (ref 10–15)
HCT VFR BLD AUTO: 49.7 % (ref 40–53)
HGB BLD-MCNC: 16.4 G/DL (ref 13.3–17.7)
MCH RBC QN AUTO: 31.5 PG (ref 26.5–33)
MCHC RBC AUTO-ENTMCNC: 33 G/DL (ref 31.5–36.5)
MCV RBC AUTO: 96 FL (ref 78–100)
PLATELET # BLD AUTO: 188 10E9/L (ref 150–450)
RBC # BLD AUTO: 5.2 10E12/L (ref 4.4–5.9)
WBC # BLD AUTO: 5.7 10E9/L (ref 4–11)

## 2019-01-24 PROCEDURE — 85027 COMPLETE CBC AUTOMATED: CPT | Performed by: INTERNAL MEDICINE

## 2019-01-24 PROCEDURE — 80053 COMPREHEN METABOLIC PANEL: CPT | Performed by: INTERNAL MEDICINE

## 2019-01-24 PROCEDURE — 80061 LIPID PANEL: CPT | Performed by: INTERNAL MEDICINE

## 2019-01-24 PROCEDURE — G0103 PSA SCREENING: HCPCS | Performed by: INTERNAL MEDICINE

## 2019-01-24 PROCEDURE — 99396 PREV VISIT EST AGE 40-64: CPT | Performed by: INTERNAL MEDICINE

## 2019-01-24 PROCEDURE — 36415 COLL VENOUS BLD VENIPUNCTURE: CPT | Performed by: INTERNAL MEDICINE

## 2019-01-24 NOTE — PATIENT INSTRUCTIONS
"You should get the new shingles vaccine series \"SHINGRIX\" (not Zostavax) at a pharmacy.          Preventive Health Recommendations  Male Ages 50 - 64    Yearly exam:             See your health care provider every year in order to  o   Review health changes.   o   Discuss preventive care.    o   Review your medicines if your doctor has prescribed any.     Have a cholesterol test every 5 years, or more frequently if you are at risk for high cholesterol/heart disease.     Have a diabetes test (fasting glucose) every three years. If you are at risk for diabetes, you should have this test more often.     Have a colonoscopy at age 50, or have a yearly FIT test (stool test). These exams will check for colon cancer.      Talk with your health care provider about whether or not a prostate cancer screening test (PSA) is right for you.    You should be tested each year for STDs (sexually transmitted diseases), if you re at risk.     Shots: Get a flu shot each year. Get a tetanus shot every 10 years.     Nutrition:    Eat at least 5 servings of fruits and vegetables daily.     Eat whole-grain bread, whole-wheat pasta and brown rice instead of white grains and rice.     Get adequate Calcium and Vitamin D.     Lifestyle    Exercise for at least 150 minutes a week (30 minutes a day, 5 days a week). This will help you control your weight and prevent disease.     Limit alcohol to one drink per day.     No smoking.     Wear sunscreen to prevent skin cancer.     See your dentist every six months for an exam and cleaning.     See your eye doctor every 1 to 2 years.    "

## 2019-01-24 NOTE — LETTER
[address]  [address]  [place]  [address]  Tel: [phone]    January 28, 2019    Scott Lynn  [facility] [facility] [address]        Dear Cristal Shane,    The following letter pertains to your most recent diagnostic tests:    -Your prostate specific antigen (PSA) test result returned normal.     -Liver and gallbladder tests are normal for you. (ALT,AST, Alk phos, bilirubin), kidney function is normal for you (Creatinine, GFR), Sodium is normal, Potassium is normal for you, Calcium is normal for you, Glucose (blood sugar) is normal for you.      -Your cholesterol panel looks healthy.     -Your complete blood counts including your hemoglobin returned normal for you.         Bottom line:  Your lab results look healthy and at goal.        Follow up:  Schedule an appointment for a physical examination with fasting blood tests in one year's time, or return sooner if new questions, symptoms or problems arise.     Sincerely,    Jose L Eason MD/MAAME          Enclosure: Lab Results  Results for orders placed or performed in visit on 01/24/19   Comprehensive metabolic panel   Result Value Ref Range    Sodium 138 133 - 144 mmol/L    Potassium 3.8 3.4 - 5.3 mmol/L    Chloride 102 94 - 109 mmol/L    Carbon Dioxide 27 20 - 32 mmol/L    Anion Gap 9 3 - 14 mmol/L    Glucose 90 70 - 99 mg/dL    Urea Nitrogen 15 7 - 30 mg/dL    Creatinine 1.08 0.66 - 1.25 mg/dL    GFR Estimate 73 >60 mL/min/[1.73_m2]    GFR Estimate If Black 85 >60 mL/min/[1.73_m2]    Calcium 8.7 8.5 - 10.1 mg/dL    Bilirubin Total 0.7 0.2 - 1.3 mg/dL    Albumin 4.0 3.4 - 5.0 g/dL    Protein Total 7.6 6.8 - 8.8 g/dL    Alkaline Phosphatase 64 40 - 150 U/L    ALT 42 0 - 70 U/L    AST 27 0 - 45 U/L   CBC with platelets   Result Value Ref Range    WBC 5.7 4.0 - 11.0 10e9/L    RBC Count 5.20 4.4 - 5.9 10e12/L    Hemoglobin 16.4 13.3 - 17.7 g/dL    Hematocrit 49.7 40.0 - 53.0 %    MCV 96 78 - 100 fl    MCH 31.5 26.5 - 33.0 pg     MCHC 33.0 31.5 - 36.5 g/dL    RDW 14.2 10.0 - 15.0 %    Platelet Count 188 150 - 450 10e9/L   Prostate spec antigen screen   Result Value Ref Range    PSA 0.31 0 - 4 ug/L   Lipid panel reflex to direct LDL Fasting   Result Value Ref Range    Cholesterol 184 <200 mg/dL    Triglycerides 131 <150 mg/dL    HDL Cholesterol 57 >39 mg/dL    LDL Cholesterol Calculated 101 (H) <100 mg/dL    Non HDL Cholesterol 127 <130 mg/dL

## 2019-01-24 NOTE — PROGRESS NOTES
SUBJECTIVE:   CC: Scott Lynn is an 63 year old male who presents for preventive health visit.     Healthy Habits:    Do you get at least three servings of calcium containing foods daily (dairy, green leafy vegetables, etc.)? yes    Amount of exercise or daily activities, outside of work: 5 day(s) per week    Problems taking medications regularly No    Medication side effects: No    Have you had an eye exam in the past two years? no    Do you see a dentist twice per year? yes    Do you have sleep apnea, excessive snoring or daytime drowsiness?no          Today's PHQ-2 Score:   PHQ-2 ( 1999 Pfizer) 1/24/2019 12/20/2017   Q1: Little interest or pleasure in doing things 0 0   Q2: Feeling down, depressed or hopeless 0 0   PHQ-2 Score 0 0   Q1: Little interest or pleasure in doing things - -   Q2: Feeling down, depressed or hopeless - -   PHQ-2 Score - -       Abuse: Current or Past(Physical, Sexual or Emotional)- No  Do you feel safe in your environment? Yes    Social History     Tobacco Use     Smoking status: Never Smoker     Smokeless tobacco: Never Used   Substance Use Topics     Alcohol use: Yes     Alcohol/week: 0.0 oz     Comment: 4-5 drinks per week     If you drink alcohol do you typically have >3 drinks per day or >7 drinks per week? No                      Last PSA:   PSA   Date Value Ref Range Status   12/20/2017 0.30 0 - 4 ug/L Final     Comment:     Assay Method:  Chemiluminescence using Siemens Vista analyzer       Reviewed orders with patient. Reviewed health maintenance and updated orders accordingly - Yes  Patient Active Problem List   Diagnosis     HTN (hypertension)     Advanced directives, counseling/discussion     Hyperlipidemia LDL goal <130     Cervical radiculopathy     Neck pain     Intrinsic eczema     History reviewed. No pertinent surgical history.    Social History     Tobacco Use     Smoking status: Never Smoker     Smokeless tobacco: Never Used   Substance Use Topics     Alcohol  use: Yes     Alcohol/week: 0.0 oz     Comment: 4-5 drinks per week     Family History   Problem Relation Age of Onset     Allergies Father          in 30s     Parkinsonism Mother      Diabetes No family hx of      Cancer - colorectal No family hx of      Prostate Cancer No family hx of      Skin Cancer No family hx of         No known family hx of skin cancer     Melanoma No family hx of          Current Outpatient Medications   Medication Sig Dispense Refill     atorvastatin (LIPITOR) 10 MG tablet TAKE 1 TABLET(10 MG) BY MOUTH DAILY 30 tablet 0     betamethasone dipropionate (DIPROSONE) 0.05 % ointment Apply to stubborn eczema on hands 2 to 3 times weekly as needed 60 g 1     clotrimazole (LOTRIMIN) 1 % cream Apply topically 2 times daily 113 g 1     losartan (COZAAR) 50 MG tablet TAKE 1 TABLET(50 MG) BY MOUTH DAILY 30 tablet 0     Multiple Vitamin (MULTI-VITAMIN) per tablet Take 1 tablet by mouth daily.       olopatadine HCl (PATADAY) 0.2 % SOLN Place 1 drop into both eyes daily 2.5 mL 3     triamcinolone (KENALOG) 0.1 % ointment Apply topically 2 times daily 454 g 1     No Known Allergies    Reviewed and updated as needed this visit by clinical staff  Tobacco  Allergies  Med Hx  Surg Hx  Fam Hx  Soc Hx        Reviewed and updated as needed this visit by Provider  Tobacco  Med Hx  Surg Hx  Fam Hx  Soc Hx       Past Medical History:   Diagnosis Date     HTN (hypertension) 2012     Hyperlipidemia LDL goal <130 2012     Intrinsic eczema 2017      History reviewed. No pertinent surgical history.    ROS:  A 10 organ systems ROS is negative.     OBJECTIVE:   /74 (BP Location: Right arm, Patient Position: Sitting, Cuff Size: Adult Regular)   Pulse 68   Temp 97  F (36.1  C) (Oral)   Wt 93 kg (205 lb)   SpO2 97%   BMI 27.80 kg/m    EXAM:  GENERAL: healthy, alert and no distress  EYES: Eyes grossly normal to inspection, PERRL and conjunctivae and sclerae normal  HENT: ear canals and  TM's normal, nose and mouth without ulcers or lesions  NECK: no adenopathy, no asymmetry, masses, or scars and thyroid normal to palpation  RESP: lungs clear to auscultation - no rales, rhonchi or wheezes  CV: regular rate and rhythm, normal S1 S2, no S3 or S4, no murmur, click or rub, no peripheral edema and peripheral pulses strong  ABDOMEN: soft, nontender, no hepatosplenomegaly, no masses and bowel sounds normal  RECTAL: normal sphincter tone, no rectal masses, prostate normal size, smooth, nontender without nodules or masses  MS: no gross musculoskeletal defects noted, no edema  SKIN: no suspicious lesions or rashes  NEURO: Normal strength and tone, mentation intact and speech normal  PSYCH: mentation appears normal, affect normal/bright    Diagnostic Test Results:  Labs pending     ASSESSMENT/PLAN:   1. Routine general medical examination at a health care facility      2. Essential hypertension  OK control    3. Hyperlipidemia LDL goal <100  On statin therapy recheck   - Comprehensive metabolic panel  - CBC with platelets  - Lipid panel reflex to direct LDL Fasting    4. Prostate cancer screening    - Prostate spec antigen screen    COUNSELING:  Reviewed preventive health counseling, as reflected in patient instructions  Special attention given to:        Regular exercise       Healthy diet/nutrition       Immunizations    Shingrix recommended; he declined a flu shot today              Consider Hep C screening for patients born between 1945 and 1965; done        HIV screeninx in teen years, 1x in adult years, and at intervals if high risk; done       Colon cancer screening; reminded to schedule colonoscopy had a polyp when last check in [place] in        Prostate cancer screening; PSA today        Consider lung cancer screening for ages 55-80 years and 30 pack-year smoking history  ; never smoker     BP Readings from Last 1 Encounters:   19 122/74     Estimated body mass index is 27.8 kg/m  as  calculated from the following:    Height as of 12/20/17: 1.829 m (6').    Weight as of this encounter: 93 kg (205 lb).           reports that  has never smoked. he has never used smokeless tobacco.      Counseling Resources:  ATP IV Guidelines  Pooled Cohorts Equation Calculator  FRAX Risk Assessment  ICSI Preventive Guidelines  Dietary Guidelines for Americans, 2010  USDA's MyPlate  ASA Prophylaxis  Lung CA Screening    Jose L Eason MD  [facility], [place]

## 2019-01-25 LAB
ALBUMIN SERPL-MCNC: 4 G/DL (ref 3.4–5)
ALP SERPL-CCNC: 64 U/L (ref 40–150)
ALT SERPL W P-5'-P-CCNC: 42 U/L (ref 0–70)
ANION GAP SERPL CALCULATED.3IONS-SCNC: 9 MMOL/L (ref 3–14)
AST SERPL W P-5'-P-CCNC: 27 U/L (ref 0–45)
BILIRUB SERPL-MCNC: 0.7 MG/DL (ref 0.2–1.3)
BUN SERPL-MCNC: 15 MG/DL (ref 7–30)
CALCIUM SERPL-MCNC: 8.7 MG/DL (ref 8.5–10.1)
CHLORIDE SERPL-SCNC: 102 MMOL/L (ref 94–109)
CHOLEST SERPL-MCNC: 184 MG/DL
CO2 SERPL-SCNC: 27 MMOL/L (ref 20–32)
CREAT SERPL-MCNC: 1.08 MG/DL (ref 0.66–1.25)
GFR SERPL CREATININE-BSD FRML MDRD: 73 ML/MIN/{1.73_M2}
GLUCOSE SERPL-MCNC: 90 MG/DL (ref 70–99)
HDLC SERPL-MCNC: 57 MG/DL
LDLC SERPL CALC-MCNC: 101 MG/DL
NONHDLC SERPL-MCNC: 127 MG/DL
POTASSIUM SERPL-SCNC: 3.8 MMOL/L (ref 3.4–5.3)
PROT SERPL-MCNC: 7.6 G/DL (ref 6.8–8.8)
PSA SERPL-ACNC: 0.31 UG/L (ref 0–4)
SODIUM SERPL-SCNC: 138 MMOL/L (ref 133–144)
TRIGL SERPL-MCNC: 131 MG/DL

## 2019-01-27 NOTE — RESULT ENCOUNTER NOTE
The following letter pertains to your most recent diagnostic tests:    -Your prostate specific antigen (PSA) test result returned normal.     -Liver and gallbladder tests are normal for you. (ALT,AST, Alk phos, bilirubin), kidney function is normal for you (Creatinine, GFR), Sodium is normal, Potassium is normal for you, Calcium is normal for you, Glucose (blood sugar) is normal for you.      -Your cholesterol panel looks healthy.     -Your complete blood counts including your hemoglobin returned normal for you.         Bottom line:  Your lab results look healthy and at goal.        Follow up:  Schedule an appointment for a physical examination with fasting blood tests in one year's time, or return sooner if new questions, symptoms or problems arise.       Sincerely,    Dr. Eason

## 2019-02-09 DIAGNOSIS — I10 ESSENTIAL HYPERTENSION: ICD-10-CM

## 2019-02-09 DIAGNOSIS — E78.5 HYPERLIPIDEMIA LDL GOAL <130: ICD-10-CM

## 2019-02-11 NOTE — TELEPHONE ENCOUNTER
"Requested Prescriptions   Pending Prescriptions Disp Refills     atorvastatin (LIPITOR) 10 MG tablet [Pharmacy Med Name: ATORVASTATIN 10MG TABLETS] 30 tablet 0     Sig: TAKE 1 TABLET BY MOUTH DAILY    Statins Protocol Passed - 2/9/2019 12:22 PM       Passed - LDL on file in past 12 months    Recent Labs   Lab Test 01/24/19  1145   *            Passed - No abnormal creatine kinase in past 12 months    No lab results found.            Passed - Recent (12 mo) or future (30 days) visit within the authorizing provider's specialty    Patient had office visit in the last 12 months or has a visit in the next 30 days with authorizing provider or within the authorizing provider's specialty.  See \"Patient Info\" tab in inbasket, or \"Choose Columns\" in Meds & Orders section of the refill encounter.             Passed - Medication is active on med list       Passed - Patient is age 18 or older        losartan (COZAAR) 50 MG tablet [Pharmacy Med Name: LOSARTAN 50MG TABLETS] 30 tablet 0     Sig: TAKE 1 TABLET BY MOUTH ONCE DAILY    Angiotensin-II Receptors Passed - 2/9/2019 12:22 PM       Passed - Blood pressure under 140/90 in past 12 months    BP Readings from Last 3 Encounters:   01/24/19 122/74   12/20/17 135/86   01/11/17 117/76                Passed - Recent (12 mo) or future (30 days) visit within the authorizing provider's specialty    Patient had office visit in the last 12 months or has a visit in the next 30 days with authorizing provider or within the authorizing provider's specialty.  See \"Patient Info\" tab in inbasket, or \"Choose Columns\" in Meds & Orders section of the refill encounter.             Passed - Medication is active on med list       Passed - Patient is age 18 or older       Passed - Normal serum creatinine on file in past 12 months    Recent Labs   Lab Test 01/24/19  1145   CR 1.08            Passed - Normal serum potassium on file in past 12 months    Recent Labs   Lab Test 01/24/19  1145 "   POTASSIUM 3.8                    ATORVASTATIN  Last Written Prescription Date:  01/09/19  Last Fill Quantity: 30,  # refills: 0   Last office visit: 1/24/2019 with prescribing provider:     Future Office Visit:      A prescription for LOSARTAN  Last Written Prescription Date:  30  Last Fill Quantity: 0,  # refills:    Last office visit: 1/24/2019 with prescribing provider:  01/24/19   Future Office Visit:

## 2019-02-11 NOTE — TELEPHONE ENCOUNTER
"atorvastatin (LIPITOR) 10 MG tablet 30 tablet 0 1/9/2019     losartan (COZAAR) 50 MG tablet 30 tablet 0 1/9/2019         Last Written Prescription Date:  01/09/2019  Last Fill Quantity: 30,  # refills: 0   Last office visit: 1/24/2019 with prescribing provider:     Future Office Visit:  Unknown    Requested Prescriptions   Pending Prescriptions Disp Refills     atorvastatin (LIPITOR) 10 MG tablet [Pharmacy Med Name: ATORVASTATIN 10MG TABLETS] 30 tablet 0     Sig: TAKE 1 TABLET BY MOUTH DAILY    Statins Protocol Passed - 2/9/2019 12:22 PM       Passed - LDL on file in past 12 months    Recent Labs   Lab Test 01/24/19  1145   *            Passed - No abnormal creatine kinase in past 12 months    No lab results found.            Passed - Recent (12 mo) or future (30 days) visit within the authorizing provider's specialty    Patient had office visit in the last 12 months or has a visit in the next 30 days with authorizing provider or within the authorizing provider's specialty.  See \"Patient Info\" tab in inbasket, or \"Choose Columns\" in Meds & Orders section of the refill encounter.             Passed - Medication is active on med list       Passed - Patient is age 18 or older        losartan (COZAAR) 50 MG tablet [Pharmacy Med Name: LOSARTAN 50MG TABLETS] 30 tablet 0     Sig: TAKE 1 TABLET BY MOUTH ONCE DAILY    Angiotensin-II Receptors Passed - 2/9/2019 12:22 PM       Passed - Blood pressure under 140/90 in past 12 months    BP Readings from Last 3 Encounters:   01/24/19 122/74   12/20/17 135/86   01/11/17 117/76                Passed - Recent (12 mo) or future (30 days) visit within the authorizing provider's specialty    Patient had office visit in the last 12 months or has a visit in the next 30 days with authorizing provider or within the authorizing provider's specialty.  See \"Patient Info\" tab in inbasket, or \"Choose Columns\" in Meds & Orders section of the refill encounter.             Passed - " Medication is active on med list       Passed - Patient is age 18 or older       Passed - Normal serum creatinine on file in past 12 months    Recent Labs   Lab Test 01/24/19  1145   CR 1.08            Passed - Normal serum potassium on file in past 12 months    Recent Labs   Lab Test 01/24/19  1145   POTASSIUM 3.8

## 2019-02-12 RX ORDER — ATORVASTATIN CALCIUM 10 MG/1
TABLET, FILM COATED ORAL
Qty: 90 TABLET | Refills: 3 | Status: SHIPPED | OUTPATIENT
Start: 2019-02-12 | End: 2020-02-11

## 2019-02-12 RX ORDER — LOSARTAN POTASSIUM 50 MG/1
TABLET ORAL
Qty: 90 TABLET | Refills: 3 | Status: SHIPPED | OUTPATIENT
Start: 2019-02-12 | End: 2020-02-11

## 2019-02-12 NOTE — TELEPHONE ENCOUNTER
Prescriptions approved per [facility] Refill Protocol.  Key ARSHAD RN

## 2019-10-01 ENCOUNTER — HEALTH MAINTENANCE LETTER (OUTPATIENT)
Age: 64
End: 2019-10-01

## 2020-01-31 ENCOUNTER — HOSPITAL ENCOUNTER (OUTPATIENT)
Facility: CLINIC | Age: 65
Discharge: HOME | End: 2020-01-31
Attending: SURGERY | Admitting: SURGERY
Payer: COMMERCIAL

## 2020-01-31 ENCOUNTER — APPOINTMENT (OUTPATIENT)
Dept: SURGERY | Facility: PHYSICIAN GROUP | Age: 65
End: 2020-01-31
Payer: COMMERCIAL

## 2020-01-31 VITALS
DIASTOLIC BLOOD PRESSURE: 85 MMHG | OXYGEN SATURATION: 96 % | BODY MASS INDEX: 29.92 KG/M2 | HEIGHT: 69 IN | RESPIRATION RATE: 17 BRPM | WEIGHT: 202 LBS | HEART RATE: 69 BPM | SYSTOLIC BLOOD PRESSURE: 125 MMHG

## 2020-01-31 LAB — COLONOSCOPY: NORMAL

## 2020-01-31 PROCEDURE — 25000128 H RX IP 250 OP 636: Performed by: SURGERY

## 2020-01-31 PROCEDURE — 88305 TISSUE EXAM BY PATHOLOGIST: CPT | Mod: 26 | Performed by: SURGERY

## 2020-01-31 PROCEDURE — 45380 COLONOSCOPY AND BIOPSY: CPT | Performed by: SURGERY

## 2020-01-31 PROCEDURE — G0500 MOD SEDAT ENDO SERVICE >5YRS: HCPCS | Performed by: SURGERY

## 2020-01-31 PROCEDURE — 99152 MOD SED SAME PHYS/QHP 5/>YRS: CPT | Mod: 59 | Performed by: SURGERY

## 2020-01-31 PROCEDURE — 45380 COLONOSCOPY AND BIOPSY: CPT | Mod: PT | Performed by: SURGERY

## 2020-01-31 PROCEDURE — 88305 TISSUE EXAM BY PATHOLOGIST: CPT | Performed by: SURGERY

## 2020-01-31 RX ORDER — LIDOCAINE 40 MG/G
CREAM TOPICAL
Status: DISCONTINUED | OUTPATIENT
Start: 2020-01-31 | End: 2020-01-31 | Stop reason: HOSPADM

## 2020-01-31 RX ORDER — ONDANSETRON 2 MG/ML
4 INJECTION INTRAMUSCULAR; INTRAVENOUS
Status: DISCONTINUED | OUTPATIENT
Start: 2020-01-31 | End: 2020-01-31 | Stop reason: HOSPADM

## 2020-01-31 RX ORDER — FENTANYL CITRATE 50 UG/ML
INJECTION, SOLUTION INTRAMUSCULAR; INTRAVENOUS PRN
Status: DISCONTINUED | OUTPATIENT
Start: 2020-01-31 | End: 2020-01-31 | Stop reason: HOSPADM

## 2020-01-31 ASSESSMENT — MIFFLIN-ST. JEOR: SCORE: 1706.65

## 2020-02-03 ENCOUNTER — TELEPHONE (OUTPATIENT)
Dept: SURGERY | Facility: CLINIC | Age: 65
End: 2020-02-03

## 2020-02-03 LAB — COPATH REPORT: NORMAL

## 2020-02-03 NOTE — TELEPHONE ENCOUNTER
Called pt as directed re: path report from colonoscopy.  Reviewed letter and results with pt.  Letter also mailed to pt address on file.  Arianna Batista RN on 2/3/2020 at 1:44 PM

## 2020-02-10 DIAGNOSIS — E78.5 HYPERLIPIDEMIA LDL GOAL <130: ICD-10-CM

## 2020-02-10 DIAGNOSIS — I10 ESSENTIAL HYPERTENSION: ICD-10-CM

## 2020-02-10 NOTE — TELEPHONE ENCOUNTER
"atorvastatin (LIPITOR) 10 MG tablet     Last Written Prescription Date:  2/12/2019  Last Fill Quantity: 90,  # refills: 3   Last office visit: 1/24/2019 with prescribing provider:  Dr. Eason    Future Office Visit:   Next 5 appointments (look out 90 days)    Feb 26, 2020  9:00 AM CST  PHYSICAL with Jose L Eason MD  [facility]) [address] [address]  [phone]         losartan (COZAAR) 50 MG tablet    Last Written Prescription Date:  2/12/2019  Last Fill Quantity: 90,  # refills: 3   Last office visit: 1/24/2019 with prescribing provider:  Dr. Eason    Future Office Visit:   Next 5 appointments (look out 90 days)    Feb 26, 2020  9:00 AM CST  PHYSICAL with [facility] L [facility]  [facility] [address] [address]  [phone]           Requested Prescriptions   Pending Prescriptions Disp Refills     atorvastatin (LIPITOR) 10 MG tablet [Pharmacy Med Name: ATORVASTATIN 10MG TABLETS] 90 tablet 3     Sig: TAKE 1 TABLET BY MOUTH DAILY       Statins Protocol Failed - 2/10/2020  1:35 PM        Failed - LDL on file in past 12 months     Recent Labs   Lab Test 01/24/19  1145   *             Passed - No abnormal creatine kinase in past 12 months     No lab results found.             Passed - Recent (12 mo) or future (30 days) visit within the authorizing provider's specialty     Patient has had an office visit with the authorizing provider or a provider within the authorizing providers department within the previous 12 mos or has a future within next 30 days. See \"Patient Info\" tab in inbasket, or \"Choose Columns\" in Meds & Orders section of the refill encounter.              Passed - Medication is active on med list        Passed - Patient is age 18 or older        losartan (COZAAR) 50 MG tablet [Pharmacy Med Name: LOSARTAN 50MG TABLETS] 90 tablet 3     Sig: TAKE 1 TABLET BY MOUTH EVERY DAY       " "Angiotensin-II Receptors Failed - 2/10/2020  1:35 PM        Failed - Normal serum creatinine on file in past 12 months     Recent Labs   Lab Test 01/24/19  1145   CR 1.08             Failed - Normal serum potassium on file in past 12 months     Recent Labs   Lab Test 01/24/19  1145   POTASSIUM 3.8                    Passed - Last blood pressure under 140/90 in past 12 months     BP Readings from Last 3 Encounters:   01/31/20 125/85   01/24/19 122/74   12/20/17 135/86                 Passed - Recent (12 mo) or future (30 days) visit within the authorizing provider's specialty     Patient has had an office visit with the authorizing provider or a provider within the authorizing providers department within the previous 12 mos or has a future within next 30 days. See \"Patient Info\" tab in inbasket, or \"Choose Columns\" in Meds & Orders section of the refill encounter.              Passed - Medication is active on med list        Passed - Patient is age 18 or older          "

## 2020-02-11 RX ORDER — ATORVASTATIN CALCIUM 10 MG/1
TABLET, FILM COATED ORAL
Qty: 90 TABLET | Refills: 0 | Status: SHIPPED | OUTPATIENT
Start: 2020-02-11 | End: 2020-05-19

## 2020-02-11 RX ORDER — LOSARTAN POTASSIUM 50 MG/1
TABLET ORAL
Qty: 90 TABLET | Refills: 0 | Status: SHIPPED | OUTPATIENT
Start: 2020-02-11 | End: 2020-05-19

## 2020-02-26 ENCOUNTER — TRANSFERRED RECORDS (OUTPATIENT)
Dept: HEALTH INFORMATION MANAGEMENT | Facility: CLINIC | Age: 65
End: 2020-02-26

## 2020-02-26 ENCOUNTER — OFFICE VISIT (OUTPATIENT)
Dept: FAMILY MEDICINE | Facility: CLINIC | Age: 65
End: 2020-02-26
Payer: COMMERCIAL

## 2020-02-26 VITALS
OXYGEN SATURATION: 97 % | TEMPERATURE: 98 F | HEIGHT: 69 IN | WEIGHT: 201 LBS | HEART RATE: 67 BPM | DIASTOLIC BLOOD PRESSURE: 88 MMHG | BODY MASS INDEX: 29.77 KG/M2 | SYSTOLIC BLOOD PRESSURE: 137 MMHG

## 2020-02-26 DIAGNOSIS — Z12.5 PROSTATE CANCER SCREENING: ICD-10-CM

## 2020-02-26 DIAGNOSIS — I10 ESSENTIAL HYPERTENSION: ICD-10-CM

## 2020-02-26 DIAGNOSIS — E78.5 HYPERLIPIDEMIA LDL GOAL <100: ICD-10-CM

## 2020-02-26 DIAGNOSIS — L20.84 INTRINSIC ECZEMA: ICD-10-CM

## 2020-02-26 DIAGNOSIS — Z00.00 ROUTINE GENERAL MEDICAL EXAMINATION AT A HEALTH CARE FACILITY: Primary | ICD-10-CM

## 2020-02-26 LAB
ALBUMIN SERPL-MCNC: 3.6 G/DL (ref 3.4–5)
ALP SERPL-CCNC: 74 U/L (ref 40–150)
ALT SERPL W P-5'-P-CCNC: 37 U/L (ref 0–70)
ANION GAP SERPL CALCULATED.3IONS-SCNC: 5 MMOL/L (ref 3–14)
AST SERPL W P-5'-P-CCNC: 19 U/L (ref 0–45)
BILIRUB SERPL-MCNC: 0.6 MG/DL (ref 0.2–1.3)
BUN SERPL-MCNC: 16 MG/DL (ref 7–30)
CALCIUM SERPL-MCNC: 8.8 MG/DL (ref 8.5–10.1)
CHLORIDE SERPL-SCNC: 105 MMOL/L (ref 94–109)
CHOLEST SERPL-MCNC: 178 MG/DL
CO2 SERPL-SCNC: 29 MMOL/L (ref 20–32)
CREAT SERPL-MCNC: 1.03 MG/DL (ref 0.66–1.25)
ERYTHROCYTE [DISTWIDTH] IN BLOOD BY AUTOMATED COUNT: 13.7 % (ref 10–15)
GFR SERPL CREATININE-BSD FRML MDRD: 77 ML/MIN/{1.73_M2}
GLUCOSE SERPL-MCNC: 100 MG/DL (ref 70–99)
HCT VFR BLD AUTO: 47 % (ref 40–53)
HDLC SERPL-MCNC: 53 MG/DL
HGB BLD-MCNC: 15.7 G/DL (ref 13.3–17.7)
LDLC SERPL CALC-MCNC: 103 MG/DL
MCH RBC QN AUTO: 31.7 PG (ref 26.5–33)
MCHC RBC AUTO-ENTMCNC: 33.4 G/DL (ref 31.5–36.5)
MCV RBC AUTO: 95 FL (ref 78–100)
NONHDLC SERPL-MCNC: 125 MG/DL
PLATELET # BLD AUTO: 198 10E9/L (ref 150–450)
POTASSIUM SERPL-SCNC: 4.5 MMOL/L (ref 3.4–5.3)
PROT SERPL-MCNC: 7.4 G/DL (ref 6.8–8.8)
PSA SERPL-ACNC: 0.85 UG/L (ref 0–4)
RBC # BLD AUTO: 4.96 10E12/L (ref 4.4–5.9)
SODIUM SERPL-SCNC: 139 MMOL/L (ref 133–144)
TRIGL SERPL-MCNC: 112 MG/DL
WBC # BLD AUTO: 7 10E9/L (ref 4–11)

## 2020-02-26 PROCEDURE — 99213 OFFICE O/P EST LOW 20 MIN: CPT | Mod: 25 | Performed by: INTERNAL MEDICINE

## 2020-02-26 PROCEDURE — 85027 COMPLETE CBC AUTOMATED: CPT | Performed by: INTERNAL MEDICINE

## 2020-02-26 PROCEDURE — 80061 LIPID PANEL: CPT | Performed by: INTERNAL MEDICINE

## 2020-02-26 PROCEDURE — 99396 PREV VISIT EST AGE 40-64: CPT | Performed by: INTERNAL MEDICINE

## 2020-02-26 PROCEDURE — 80053 COMPREHEN METABOLIC PANEL: CPT | Performed by: INTERNAL MEDICINE

## 2020-02-26 PROCEDURE — 36415 COLL VENOUS BLD VENIPUNCTURE: CPT | Performed by: INTERNAL MEDICINE

## 2020-02-26 PROCEDURE — G0103 PSA SCREENING: HCPCS | Performed by: INTERNAL MEDICINE

## 2020-02-26 RX ORDER — TRIAMCINOLONE ACETONIDE 1 MG/G
OINTMENT TOPICAL 2 TIMES DAILY
Qty: 454 G | Refills: 1 | Status: SHIPPED | OUTPATIENT
Start: 2020-02-26

## 2020-02-26 ASSESSMENT — MIFFLIN-ST. JEOR: SCORE: 1698.93

## 2020-02-26 NOTE — PROGRESS NOTES
SUBJECTIVE:   CC: Scott Lynn is an 64 year old male who presents for preventative health visit.     Healthy Habits:     Getting at least 3 servings of Calcium per day:  Yes    Bi-annual eye exam:  NO    Dental care twice a year:  Yes    Sleep apnea or symptoms of sleep apnea:  None    Diet:  Regular (no restrictions)    Frequency of exercise:  4-5 days/week    Duration of exercise:  45-60 minutes    Taking medications regularly:  Yes    Barriers to taking medications:  None    Medication side effects:  None    PHQ-2 Total Score: 0    Additional concerns today:  No      Uses kenalog topically intermittently for mildy itchy scaly rash with success   Rash has been present for years   Needs refill      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 Yes    Have you ever done Advance Care Planning? (For example, a Health Directive, POLST, or a discussion with a medical provider or your loved ones about your wishes): No, advance care planning information given to patient to review.  Patient plans to discuss their wishes with loved ones or provider.      Social History     Tobacco Use     Smoking status: Never Smoker     Smokeless tobacco: Never Used   Substance Use Topics     Alcohol use: Yes     Alcohol/week: 0.0 standard drinks     Comment: 4-5 drinks per week     If you drink alcohol do you typically have >3 drinks per day or >7 drinks per week?  No    Alcohol Use 2015   Prescreen: >3 drinks/day or >7 drinks/week? The patient does not drink >3 drinks per day nor >7 drinks per week.     Last PSA:   PSA   Date Value Ref Range Status   2019 0.31 0 - 4 ug/L Final     Comment:     Assay Method:  Chemiluminescence using Siemens Vista analyzer       Reviewed orders with patient. Reviewed health maintenance and updated orders accordingly - Yes  Patient Active Problem List   Diagnosis     HTN (hypertension)     Advanced directives, counseling/discussion     Hyperlipidemia LDL goal <100     Cervical radiculopathy     Neck pain     Intrinsic eczema     Past Surgical History:   Procedure Laterality Date     COLONOSCOPY       COLONOSCOPY N/A 2020    Procedure: COLONOSCOPY, WITH POLYPECTOMY AND BIOPSY;  Surgeon: Rasta Thompson MD;  Location:  GI     ENT SURGERY      Tonsillectomy       Social History     Tobacco Use     Smoking status: Never Smoker     Smokeless tobacco: Never Used   Substance Use Topics     Alcohol use: Yes     Alcohol/week: 0.0 standard drinks     Comment: 4-5 drinks per week     Family History   Problem Relation Age of Onset     Allergies Father          in 30s     Parkinsonism Mother      Diabetes No family hx of      Cancer - colorectal No family hx of      Prostate Cancer No family hx of      Skin Cancer No family hx of         No known family hx of skin cancer     Melanoma No family hx of          Current Outpatient Medications   Medication Sig Dispense Refill     atorvastatin (LIPITOR) 10 MG tablet TAKE 1 TABLET BY MOUTH DAILY 90 tablet 0     betamethasone dipropionate (DIPROSONE) 0.05 % ointment Apply to stubborn eczema on hands 2 to 3 times weekly as needed 60 g 1     clotrimazole (LOTRIMIN) 1 % cream Apply topically 2 times daily 113 g 1     losartan (COZAAR) 50 MG tablet TAKE 1 TABLET BY MOUTH EVERY DAY 90 tablet 0     Multiple Vitamin (MULTI-VITAMIN) per tablet Take 1 tablet by mouth daily.       olopatadine HCl  (PATADAY) 0.2 % SOLN Place 1 drop into both eyes daily 2.5 mL 3     triamcinolone (KENALOG) 0.1 % ointment Apply topically 2 times daily 454 g 1     Allergies   Allergen Reactions     No Known Allergies        Reviewed and updated as needed this visit by clinical staff         Reviewed and updated as needed this visit by Provider        Past Medical History:   Diagnosis Date     HTN (hypertension) 9/21/2012     Hyperlipidemia LDL goal <130 9/22/2012     Intrinsic eczema 12/20/2017      Past Surgical History:   Procedure Laterality Date     COLONOSCOPY       COLONOSCOPY N/A 1/31/2020    Procedure: COLONOSCOPY, WITH POLYPECTOMY AND BIOPSY;  Surgeon: Rasta Thompson MD;  Location:  GI     ENT SURGERY      Tonsillectomy       Review of Systems  A 10 organ systems ROS is negative.     OBJECTIVE:   There were no vitals taken for this visit.    Physical Exam  GENERAL: healthy, alert and no distress  EYES: Eyes grossly normal to inspection, PERRL and conjunctivae and sclerae normal  HENT: ear canals and TM's normal, nose and mouth without ulcers or lesions  NECK: no adenopathy, no asymmetry, masses, or scars and thyroid normal to palpation  RESP: lungs clear to auscultation - no rales, rhonchi or wheezes  CV: regular rate and rhythm, normal S1 S2, no S3 or S4, no murmur, click or rub, no peripheral edema and peripheral pulses strong  ABDOMEN: soft, nontender, no hepatosplenomegaly, no masses and bowel sounds normal  RECTAL: declined exam as he had recent colonoscopy and plans to have PSA  MS: no gross musculoskeletal defects noted, no edema  SKIN: no suspicious lesions or rashes  NEURO: Normal strength and tone, mentation intact and speech normal  PSYCH: mentation appears normal, affect normal/bright    Labs pending     ASSESSMENT/PLAN:   1. Routine general medical examination at a health care facility      2. Essential hypertension  OK, further weight loss would help with this and we discussed this    3.  "Hyperlipidemia LDL goal <100  On statin therapy; recheck lipids  - Lipid panel reflex to direct LDL Fasting  - Comprehensive metabolic panel  - CBC with platelets    4. Intrinsic eczema    - triamcinolone (KENALOG) 0.1 % external ointment; Apply topically 2 times daily  Dispense: 454 g; Refill: 1  - OFFICE/OUTPT VISIT,EST,LEVL II    5. Prostate cancer screening    - Prostate spec antigen screen    COUNSELING:   Reviewed preventive health counseling, as reflected in patient instructions  Special attention given to:        Regular exercise       Healthy diet/nutrition       Immunizations    Recommended Shingrix              Consider Hep C screening for patients born between 1945 and 1965; done       HIV screeninx in teen years, 1x in adult years, and at intervals if high risk; done       Colon cancer screening; repeat in 10/2024       Prostate cancer screening; PSA today        Consider lung cancer screening for ages 55-80 years and 30 pack-year smoking history  ; never smoker     Estimated body mass index is 29.83 kg/m  as calculated from the following:    Height as of 20: 1.753 m (5' 9\").    Weight as of 20: 91.6 kg (202 lb).     Weight management plan: Discussed healthy diet and exercise guidelines     reports that he has never smoked. He has never used smokeless tobacco.      Counseling Resources:  ATP IV Guidelines  Pooled Cohorts Equation Calculator  FRAX Risk Assessment  ICSI Preventive Guidelines  Dietary Guidelines for Americans, 2010  USDA's MyPlate  ASA Prophylaxis  Lung CA Screening    Jose L Eason MD  [facility]  "

## 2020-02-27 NOTE — RESULT ENCOUNTER NOTE
The following letter pertains to your most recent diagnostic tests:    -Your prostate specific antigen (PSA) test result returned normal.     -Your cholesterol panel looks healthy.     -Liver and gallbladder tests are normal for you. (ALT,AST, Alk phos, bilirubin), kidney function is normal for you (Creatinine, GFR), Sodium is normal, Potassium is normal for you, Calcium is normal for you, Glucose (blood sugar) is normal for you.      -Your complete blood counts including your hemoglobin returned normal for you.           Bottom line: Lab results look excellent      Follow up:  Schedule an appointment for a physical examination with fasting blood tests in one year's time, or return sooner if new questions, symptoms or problems arise.       Sincerely,    Dr. Eason

## 2020-04-08 ENCOUNTER — MYC MEDICAL ADVICE (OUTPATIENT)
Dept: FAMILY MEDICINE | Facility: CLINIC | Age: 65
End: 2020-04-08

## 2020-05-18 DIAGNOSIS — I10 ESSENTIAL HYPERTENSION: ICD-10-CM

## 2020-05-18 DIAGNOSIS — E78.5 HYPERLIPIDEMIA LDL GOAL <130: ICD-10-CM

## 2020-05-19 RX ORDER — ATORVASTATIN CALCIUM 10 MG/1
TABLET, FILM COATED ORAL
Qty: 90 TABLET | Refills: 3 | Status: SHIPPED | OUTPATIENT
Start: 2020-05-19 | End: 2021-06-09

## 2020-05-19 RX ORDER — LOSARTAN POTASSIUM 50 MG/1
TABLET ORAL
Qty: 90 TABLET | Refills: 3 | Status: SHIPPED | OUTPATIENT
Start: 2020-05-19 | End: 2021-06-10

## 2021-01-15 ENCOUNTER — HEALTH MAINTENANCE LETTER (OUTPATIENT)
Age: 66
End: 2021-01-15

## 2021-03-26 ENCOUNTER — MYC MEDICAL ADVICE (OUTPATIENT)
Dept: FAMILY MEDICINE | Facility: CLINIC | Age: 66
End: 2021-03-26

## 2021-03-26 NOTE — TELEPHONE ENCOUNTER
Sent message to patient.      Claudette Manning RN  [facility] Triage Team

## 2021-05-15 ENCOUNTER — HEALTH MAINTENANCE LETTER (OUTPATIENT)
Age: 66
End: 2021-05-15

## 2021-06-09 DIAGNOSIS — I10 ESSENTIAL HYPERTENSION: ICD-10-CM

## 2021-06-09 DIAGNOSIS — E78.5 HYPERLIPIDEMIA LDL GOAL <130: ICD-10-CM

## 2021-06-09 NOTE — TELEPHONE ENCOUNTER
Next 5 appointments (look out 90 days)    Jul 12, 2021  8:30 AM  PHYSICAL with Jose L Eason MD  [facility] ([facility] [address] [facility], [address]  [place] [address]  [phone]          RT Maddison (R)

## 2021-06-10 RX ORDER — ATORVASTATIN CALCIUM 10 MG/1
10 TABLET, FILM COATED ORAL DAILY
Qty: 90 TABLET | Refills: 0 | Status: SHIPPED | OUTPATIENT
Start: 2021-06-10 | End: 2021-07-12

## 2021-06-10 RX ORDER — LOSARTAN POTASSIUM 50 MG/1
50 TABLET ORAL DAILY
Qty: 90 TABLET | Refills: 0 | Status: SHIPPED | OUTPATIENT
Start: 2021-06-10 | End: 2021-07-12

## 2021-06-10 NOTE — TELEPHONE ENCOUNTER
Routing refill request to provider for review/approval because:  Labs not current:  Lab Test 02/26/20 0951   *     Lab Test 02/26/20  0951   CR 1.03     Lab Test 02/26/20 0951   POTASSIUM 4.5     Last office vist: 2/26/20    Pended 90 day supply & 0 refills. Please Review.    Next office visit: 7/12/21      Lourdes Rivera RN  [facility]

## 2021-07-12 ENCOUNTER — OFFICE VISIT (OUTPATIENT)
Dept: FAMILY MEDICINE | Facility: CLINIC | Age: 66
End: 2021-07-12
Payer: COMMERCIAL

## 2021-07-12 VITALS
SYSTOLIC BLOOD PRESSURE: 123 MMHG | WEIGHT: 198.4 LBS | BODY MASS INDEX: 30.07 KG/M2 | DIASTOLIC BLOOD PRESSURE: 78 MMHG | HEART RATE: 71 BPM | OXYGEN SATURATION: 97 % | HEIGHT: 68 IN | TEMPERATURE: 97.4 F

## 2021-07-12 DIAGNOSIS — Z00.00 ROUTINE GENERAL MEDICAL EXAMINATION AT A HEALTH CARE FACILITY: Primary | ICD-10-CM

## 2021-07-12 DIAGNOSIS — E78.5 HYPERLIPIDEMIA LDL GOAL <100: ICD-10-CM

## 2021-07-12 DIAGNOSIS — E78.5 HYPERLIPIDEMIA LDL GOAL <130: ICD-10-CM

## 2021-07-12 DIAGNOSIS — H10.13 ALLERGIC CONJUNCTIVITIS, BILATERAL: ICD-10-CM

## 2021-07-12 DIAGNOSIS — I10 ESSENTIAL HYPERTENSION: ICD-10-CM

## 2021-07-12 LAB
ALBUMIN SERPL-MCNC: 3.7 G/DL (ref 3.4–5)
ALP SERPL-CCNC: 58 U/L (ref 40–150)
ALT SERPL W P-5'-P-CCNC: 35 U/L (ref 0–70)
ANION GAP SERPL CALCULATED.3IONS-SCNC: 3 MMOL/L (ref 3–14)
AST SERPL W P-5'-P-CCNC: 24 U/L (ref 0–45)
BASOPHILS # BLD AUTO: 0 10E3/UL (ref 0–0.2)
BASOPHILS NFR BLD AUTO: 0 %
BILIRUB SERPL-MCNC: 0.8 MG/DL (ref 0.2–1.3)
BUN SERPL-MCNC: 18 MG/DL (ref 7–30)
CALCIUM SERPL-MCNC: 8.7 MG/DL (ref 8.5–10.1)
CHLORIDE BLD-SCNC: 105 MMOL/L (ref 94–109)
CHOLEST SERPL-MCNC: 193 MG/DL
CO2 SERPL-SCNC: 29 MMOL/L (ref 20–32)
CREAT SERPL-MCNC: 1.22 MG/DL (ref 0.66–1.25)
EOSINOPHIL # BLD AUTO: 0.1 10E3/UL (ref 0–0.7)
EOSINOPHIL NFR BLD AUTO: 3 %
ERYTHROCYTE [DISTWIDTH] IN BLOOD BY AUTOMATED COUNT: 14 % (ref 10–15)
FASTING STATUS PATIENT QL REPORTED: YES
GFR SERPL CREATININE-BSD FRML MDRD: 63 ML/MIN/1.73M2
GLUCOSE BLD-MCNC: 95 MG/DL (ref 70–99)
HCT VFR BLD AUTO: 48.7 % (ref 40–53)
HDLC SERPL-MCNC: 60 MG/DL
HGB BLD-MCNC: 16.6 G/DL (ref 13.3–17.7)
LDLC SERPL CALC-MCNC: 116 MG/DL
LYMPHOCYTES # BLD AUTO: 1.7 10E3/UL (ref 0.8–5.3)
LYMPHOCYTES NFR BLD AUTO: 33 %
MCH RBC QN AUTO: 31.8 PG (ref 26.5–33)
MCHC RBC AUTO-ENTMCNC: 34.1 G/DL (ref 31.5–36.5)
MCV RBC AUTO: 93 FL (ref 78–100)
MONOCYTES # BLD AUTO: 0.6 10E3/UL (ref 0–1.3)
MONOCYTES NFR BLD AUTO: 11 %
NEUTROPHILS # BLD AUTO: 2.8 10E3/UL (ref 1.6–8.3)
NEUTROPHILS NFR BLD AUTO: 53 %
NONHDLC SERPL-MCNC: 133 MG/DL
PLATELET # BLD AUTO: 181 10E3/UL (ref 150–450)
POTASSIUM BLD-SCNC: 4.4 MMOL/L (ref 3.4–5.3)
PROT SERPL-MCNC: 7.2 G/DL (ref 6.8–8.8)
PSA SERPL-MCNC: 0.57 UG/L (ref 0–4)
RBC # BLD AUTO: 5.22 10E6/UL (ref 4.4–5.9)
SODIUM SERPL-SCNC: 137 MMOL/L (ref 133–144)
TRIGL SERPL-MCNC: 84 MG/DL
WBC # BLD AUTO: 5.3 10E3/UL (ref 4–11)

## 2021-07-12 PROCEDURE — 80053 COMPREHEN METABOLIC PANEL: CPT | Performed by: INTERNAL MEDICINE

## 2021-07-12 PROCEDURE — 99214 OFFICE O/P EST MOD 30 MIN: CPT | Mod: 25 | Performed by: INTERNAL MEDICINE

## 2021-07-12 PROCEDURE — 85025 COMPLETE CBC W/AUTO DIFF WBC: CPT | Performed by: INTERNAL MEDICINE

## 2021-07-12 PROCEDURE — 36415 COLL VENOUS BLD VENIPUNCTURE: CPT | Performed by: INTERNAL MEDICINE

## 2021-07-12 PROCEDURE — 80061 LIPID PANEL: CPT | Performed by: INTERNAL MEDICINE

## 2021-07-12 PROCEDURE — 99396 PREV VISIT EST AGE 40-64: CPT | Performed by: INTERNAL MEDICINE

## 2021-07-12 PROCEDURE — G0103 PSA SCREENING: HCPCS | Performed by: INTERNAL MEDICINE

## 2021-07-12 RX ORDER — OLOPATADINE HYDROCHLORIDE 2 MG/ML
1 SOLUTION/ DROPS OPHTHALMIC DAILY
Qty: 2.5 ML | Refills: 3 | Status: SHIPPED | OUTPATIENT
Start: 2021-07-12 | End: 2023-06-05

## 2021-07-12 RX ORDER — ATORVASTATIN CALCIUM 10 MG/1
10 TABLET, FILM COATED ORAL DAILY
Qty: 90 TABLET | Refills: 3 | Status: SHIPPED | OUTPATIENT
Start: 2021-07-12 | End: 2022-09-08

## 2021-07-12 RX ORDER — LOSARTAN POTASSIUM 50 MG/1
50 TABLET ORAL DAILY
Qty: 90 TABLET | Refills: 3 | Status: SHIPPED | OUTPATIENT
Start: 2021-07-12 | End: 2022-09-08

## 2021-07-12 ASSESSMENT — ENCOUNTER SYMPTOMS
DYSURIA: 0
FREQUENCY: 0
MYALGIAS: 0
FEVER: 0
HEMATURIA: 0
PALPITATIONS: 0
SORE THROAT: 0
NAUSEA: 0
JOINT SWELLING: 0
CHILLS: 0
SHORTNESS OF BREATH: 0
COUGH: 0
ARTHRALGIAS: 0
PARESTHESIAS: 0
WEAKNESS: 0
HEMATOCHEZIA: 0
CONSTIPATION: 0
EYE PAIN: 0
NERVOUS/ANXIOUS: 0
DIZZINESS: 0
HEADACHES: 0
DIARRHEA: 0
HEARTBURN: 0
ABDOMINAL PAIN: 0

## 2021-07-12 ASSESSMENT — MIFFLIN-ST. JEOR: SCORE: 1669.44

## 2021-07-12 NOTE — RESULT ENCOUNTER NOTE
"The following letter pertains to your most recent diagnostic tests:    -Your prostate specific antigen (PSA) test result returned normal.     -The \"bad cholesterol\" LDL has increased slightly since last check.  Watch the fried foods, saturated fats to improve this.  Increase diet fiber intake to improve this as well.  We can recheck in 1 year.    -Liver and gallbladder tests are normal for you. (ALT,AST, Alk phos, bilirubin), kidney function is normal for you (Creatinine, GFR), Sodium is normal, Potassium is normal for you, Calcium is normal for you, Glucose (blood sugar) is normal for you.      -Your complete blood counts including your hemoglobin returned normal for you.         Bottom line: Lab tests look okay with the exception of mildly worse cholesterol.  Work on your diet to improve this.  We can recheck in 1 year.      Follow up:  Schedule an appointment for a physical examination with fasting blood tests in one year's time, or return sooner if new questions, symptoms or problems arise.        Sincerely,    Dr. Eason    The 10-year ASCVD risk score ([place] DAMON Jr., et al., 2013) is: 10.3%    Values used to calculate the score:      Age: 65 years      Sex: Male      Is Non- : No      Diabetic: No      Tobacco smoker: No      Systolic Blood Pressure: 123 mmHg      Is BP treated: Yes      HDL Cholesterol: 60 mg/dL      Total Cholesterol: 193 mg/dL "

## 2021-07-12 NOTE — PROGRESS NOTES
SUBJECTIVE:   CC: Scott Lynn is an 65 year old male who presents for preventative health visit.       Patient has been advised of split billing requirements and indicates understanding: Yes  Healthy Habits:     Getting at least 3 servings of Calcium per day:  Yes    Bi-annual eye exam:  NO    Dental care twice a year:  Yes    Sleep apnea or symptoms of sleep apnea:  None    Diet:  Regular (no restrictions)    Frequency of exercise:  2-3 days/week    Duration of exercise:  15-30 minutes    Taking medications regularly:  Yes    Medication side effects:  None    PHQ-2 Total Score: 0    Additional concerns today:  No      Needs refill on allergy eye drops  Follow up hypertension and hyperlipidemia     Today's PHQ-2 Score:   PHQ-2 ( 1999 Pf[employer]) 7/12/2021   Q1: Little interest or pleasure in doing things 0   Q2: Feeling down, depressed or hopeless 0   PHQ-2 Score 0   Q1: Little interest or pleasure in doing things Not at all   Q2: Feeling down, depressed or hopeless Not at all   PHQ-2 Score 0       Abuse: Current or Past(Physical, Sexual or Emotional)- No  Do you feel safe in your environment? Yes        Social History     Tobacco Use     Smoking status: Never Smoker     Smokeless tobacco: Never Used   Substance Use Topics     Alcohol use: Yes     Alcohol/week: 0.0 standard drinks     Comment: 4-5 drinks per week     If you drink alcohol do you typically have >3 drinks per day or >7 drinks per week? No    Alcohol Use 7/12/2021   Prescreen: >3 drinks/day or >7 drinks/week? No   Prescreen: >3 drinks/day or >7 drinks/week? -       Last PSA:   PSA   Date Value Ref Range Status   02/26/2020 0.85 0 - 4 ug/L Final     Comment:     Assay Method:  Chemiluminescence using Siemens Vista analyzer       Reviewed orders with patient. Reviewed health maintenance and updated orders accordingly - Yes  Patient Active Problem List   Diagnosis     HTN (hypertension)     Advanced directives, counseling/discussion     Hyperlipidemia LDL  goal <100     Cervical radiculopathy     Neck pain     Intrinsic eczema     Past Surgical History:   Procedure Laterality Date     COLONOSCOPY       COLONOSCOPY N/A 2020    Procedure: COLONOSCOPY, WITH POLYPECTOMY AND BIOPSY;  Surgeon: Rasta Thompson MD;  Location:  GI     ENT SURGERY      Tonsillectomy       Social History     Tobacco Use     Smoking status: Never Smoker     Smokeless tobacco: Never Used   Substance Use Topics     Alcohol use: Yes     Alcohol/week: 0.0 standard drinks     Comment: 4-5 drinks per week     Family History   Problem Relation Age of Onset     Allergies Father          in 30s     Parkinsonism Mother      Diabetes No family hx of      Cancer - colorectal No family hx of      Prostate Cancer No family hx of      Skin Cancer No family hx of         No known family hx of skin cancer     Melanoma No family hx of          Current Outpatient Medications   Medication Sig Dispense Refill     atorvastatin (LIPITOR) 10 MG tablet Take 1 tablet (10 mg) by mouth daily 90 tablet 3     betamethasone dipropionate (DIPROSONE) 0.05 % ointment Apply to stubborn eczema on hands 2 to 3 times weekly as needed 60 g 1     clotrimazole (LOTRIMIN) 1 % cream Apply topically 2 times daily 113 g 1     losartan (COZAAR) 50 MG tablet Take 1 tablet (50 mg) by mouth daily 90 tablet 3     Multiple Vitamin (MULTI-VITAMIN) per tablet Take 1 tablet by mouth daily.       olopatadine (PATADAY) 0.2 % ophthalmic solution Place 1 drop into both eyes daily 2.5 mL 3     triamcinolone (KENALOG) 0.1 % external ointment Apply topically 2 times daily 454 g 1     Allergies   Allergen Reactions     No Known Allergies        Reviewed and updated as needed this visit by clinical staff  Tobacco  Allergies  Meds              Reviewed and updated as needed this visit by Provider                Past Medical History:   Diagnosis Date     HTN (hypertension) 2012     Hyperlipidemia LDL goal <130 2012     Intrinsic  "eczema 12/20/2017      Past Surgical History:   Procedure Laterality Date     COLONOSCOPY       COLONOSCOPY N/A 1/31/2020    Procedure: COLONOSCOPY, WITH POLYPECTOMY AND BIOPSY;  Surgeon: Rasta Thompson MD;  Location:  GI     ENT SURGERY      Tonsillectomy       Review of Systems  A 10 organ systems ROS is negative other than any pertinent positives or negatives previously stated.     OBJECTIVE:   /78 (BP Location: Right arm, Cuff Size: Adult Large)   Pulse 71   Temp 97.4  F (36.3  C) (Oral)   Ht 1.727 m (5' 8\")   Wt 90 kg (198 lb 6.4 oz)   SpO2 97%   BMI 30.17 kg/m      Physical Exam  GENERAL: healthy, alert and no distress  EYES: Eyes grossly normal to inspection, PERRL and conjunctivae and sclerae normal  HENT: ear canals and TM's normal, nose and mouth without ulcers or lesions  NECK: no adenopathy, no asymmetry, masses, or scars and thyroid normal to palpation  RESP: lungs clear to auscultation - no rales, rhonchi or wheezes  CV: regular rate and rhythm, normal S1 S2, no S3 or S4, no murmur, click or rub, no peripheral edema and peripheral pulses strong  ABDOMEN: soft, nontender, no hepatosplenomegaly, no masses and bowel sounds normal  RECTAL: normal sphincter tone, no rectal masses, prostate normal size, smooth, nontender without nodules or masses  MS: no gross musculoskeletal defects noted, no edema  SKIN: no suspicious lesions or rashes  NEURO: Normal strength and tone, mentation intact and speech normal  PSYCH: mentation appears normal, affect normal/bright    Labs pending     ASSESSMENT/PLAN:   1. Routine general medical examination at a health care facility    - PSA, screen; Future    2. Essential hypertension  Well controlled   - OFFICE/OUTPT VISIT,EST,LEVL IV    3. Hyperlipidemia LDL goal <100  On statin therapy ; recheck   - OFFICE/OUTPT VISIT,EST,LEVL IV  - Lipid panel reflex to direct LDL Fasting  - Comprehensive metabolic panel (BMP + Alb, Alk Phos, ALT, AST, Total. Bili, TP); " "Future  - CBC with platelets and differential; Future    4. Allergic conjunctivitis, bilateral    - olopatadine (PATADAY) 0.2 % ophthalmic solution; Place 1 drop into both eyes daily  Dispense: 2.5 mL; Refill: 3  - OFFICE/OUTPT VISIT,EST,LEVL IV    Patient has been advised of split billing requirements and indicates understanding: Yes  COUNSELING:   Reviewed preventive health counseling, as reflected in patient instructions  Special attention given to:        Regular exercise       Healthy diet/nutrition       Immunizations    Recommended shingrix again             Consider Hep C screening for all patients one time for ages 18-79 years       HIV screeninx in teen years, 1x in adult years, and at intervals if high risk       Colon cancer screening; repeat in        Prostate cancer screening; PSA       Consider lung cancer screening for ages 55-80 years and 30 pack-year smoking history  ; never smoker     Estimated body mass index is 30.17 kg/m  as calculated from the following:    Height as of this encounter: 1.727 m (5' 8\").    Weight as of this encounter: 90 kg (198 lb 6.4 oz).     Weight management plan: Discussed healthy diet and exercise guidelines    He reports that he has never smoked. He has never used smokeless tobacco.      Counseling Resources:  ATP IV Guidelines  Pooled Cohorts Equation Calculator  FRAX Risk Assessment  ICSI Preventive Guidelines  Dietary Guidelines for Americans, 2010  USDA's MyPlate  ASA Prophylaxis  Lung CA Screening    Jose L Eason MD  [facility]  "

## 2021-09-04 ENCOUNTER — HEALTH MAINTENANCE LETTER (OUTPATIENT)
Age: 66
End: 2021-09-04

## 2021-12-23 ENCOUNTER — IMMUNIZATION (OUTPATIENT)
Dept: NURSING | Facility: CLINIC | Age: 66
End: 2021-12-23
Payer: COMMERCIAL

## 2021-12-23 PROCEDURE — 0064A COVID-19,PF,MODERNA (18+ YRS BOOSTER .25ML): CPT

## 2021-12-23 PROCEDURE — 90682 RIV4 VACC RECOMBINANT DNA IM: CPT

## 2021-12-23 PROCEDURE — 90471 IMMUNIZATION ADMIN: CPT

## 2021-12-23 PROCEDURE — 91306 COVID-19,PF,MODERNA (18+ YRS BOOSTER .25ML): CPT

## 2022-09-08 DIAGNOSIS — I10 ESSENTIAL HYPERTENSION: ICD-10-CM

## 2022-09-08 DIAGNOSIS — E78.5 HYPERLIPIDEMIA LDL GOAL <130: ICD-10-CM

## 2022-09-08 RX ORDER — ATORVASTATIN CALCIUM 10 MG/1
10 TABLET, FILM COATED ORAL DAILY
Qty: 90 TABLET | Refills: 3 | Status: SHIPPED | OUTPATIENT
Start: 2022-09-08 | End: 2023-09-02

## 2022-09-08 RX ORDER — LOSARTAN POTASSIUM 50 MG/1
50 TABLET ORAL DAILY
Qty: 90 TABLET | Refills: 3 | Status: SHIPPED | OUTPATIENT
Start: 2022-09-08 | End: 2023-03-30

## 2022-10-22 ENCOUNTER — HEALTH MAINTENANCE LETTER (OUTPATIENT)
Age: 67
End: 2022-10-22

## 2022-11-17 ENCOUNTER — IMMUNIZATION (OUTPATIENT)
Dept: NURSING | Facility: CLINIC | Age: 67
End: 2022-11-17
Payer: COMMERCIAL

## 2022-11-17 PROCEDURE — 90471 IMMUNIZATION ADMIN: CPT

## 2022-11-17 PROCEDURE — 0134A COVID-19,PF,MODERNA BIVALENT: CPT

## 2022-11-17 PROCEDURE — 90662 IIV NO PRSV INCREASED AG IM: CPT

## 2022-11-17 PROCEDURE — 91313 COVID-19,PF,MODERNA BIVALENT: CPT

## 2023-03-30 ENCOUNTER — OFFICE VISIT (OUTPATIENT)
Dept: FAMILY MEDICINE | Facility: CLINIC | Age: 68
End: 2023-03-30
Payer: COMMERCIAL

## 2023-03-30 VITALS
TEMPERATURE: 97.4 F | HEART RATE: 62 BPM | SYSTOLIC BLOOD PRESSURE: 154 MMHG | OXYGEN SATURATION: 97 % | HEIGHT: 69 IN | DIASTOLIC BLOOD PRESSURE: 94 MMHG | WEIGHT: 203.7 LBS | RESPIRATION RATE: 16 BRPM | BODY MASS INDEX: 30.17 KG/M2

## 2023-03-30 DIAGNOSIS — E78.5 HYPERLIPIDEMIA LDL GOAL <100: ICD-10-CM

## 2023-03-30 DIAGNOSIS — Z23 NEED FOR VACCINATION: ICD-10-CM

## 2023-03-30 DIAGNOSIS — I71.40 ABDOMINAL AORTIC ANEURYSM (AAA) WITHOUT RUPTURE, UNSPECIFIED PART (H): ICD-10-CM

## 2023-03-30 DIAGNOSIS — Z13.6 SCREENING FOR AAA (ABDOMINAL AORTIC ANEURYSM): ICD-10-CM

## 2023-03-30 DIAGNOSIS — N52.9 ERECTILE DYSFUNCTION, UNSPECIFIED ERECTILE DYSFUNCTION TYPE: ICD-10-CM

## 2023-03-30 DIAGNOSIS — Z00.00 ROUTINE GENERAL MEDICAL EXAMINATION AT A HEALTH CARE FACILITY: Primary | ICD-10-CM

## 2023-03-30 DIAGNOSIS — I10 ESSENTIAL HYPERTENSION: ICD-10-CM

## 2023-03-30 LAB
ALBUMIN SERPL BCG-MCNC: 4.3 G/DL (ref 3.5–5.2)
ALP SERPL-CCNC: 60 U/L (ref 40–129)
ALT SERPL W P-5'-P-CCNC: 26 U/L (ref 10–50)
ANION GAP SERPL CALCULATED.3IONS-SCNC: 11 MMOL/L (ref 7–15)
AST SERPL W P-5'-P-CCNC: 24 U/L (ref 10–50)
BILIRUB SERPL-MCNC: 0.6 MG/DL
BUN SERPL-MCNC: 14.3 MG/DL (ref 8–23)
CALCIUM SERPL-MCNC: 9.6 MG/DL (ref 8.8–10.2)
CHLORIDE SERPL-SCNC: 103 MMOL/L (ref 98–107)
CHOLEST SERPL-MCNC: 183 MG/DL
CREAT SERPL-MCNC: 1.07 MG/DL (ref 0.67–1.17)
DEPRECATED HCO3 PLAS-SCNC: 27 MMOL/L (ref 22–29)
ERYTHROCYTE [DISTWIDTH] IN BLOOD BY AUTOMATED COUNT: 13.1 % (ref 10–15)
GFR SERPL CREATININE-BSD FRML MDRD: 77 ML/MIN/1.73M2
GLUCOSE SERPL-MCNC: 98 MG/DL (ref 70–99)
HCT VFR BLD AUTO: 48.7 % (ref 40–53)
HDLC SERPL-MCNC: 62 MG/DL
HGB BLD-MCNC: 16.3 G/DL (ref 13.3–17.7)
LDLC SERPL CALC-MCNC: 108 MG/DL
MCH RBC QN AUTO: 31.7 PG (ref 26.5–33)
MCHC RBC AUTO-ENTMCNC: 33.5 G/DL (ref 31.5–36.5)
MCV RBC AUTO: 95 FL (ref 78–100)
NONHDLC SERPL-MCNC: 121 MG/DL
PLATELET # BLD AUTO: 185 10E3/UL (ref 150–450)
POTASSIUM SERPL-SCNC: 5 MMOL/L (ref 3.4–5.3)
PROT SERPL-MCNC: 7.2 G/DL (ref 6.4–8.3)
PSA SERPL DL<=0.01 NG/ML-MCNC: 0.28 NG/ML (ref 0–4.5)
RBC # BLD AUTO: 5.14 10E6/UL (ref 4.4–5.9)
SODIUM SERPL-SCNC: 141 MMOL/L (ref 136–145)
TRIGL SERPL-MCNC: 66 MG/DL
WBC # BLD AUTO: 6.2 10E3/UL (ref 4–11)

## 2023-03-30 PROCEDURE — 90714 TD VACC NO PRESV 7 YRS+ IM: CPT | Performed by: INTERNAL MEDICINE

## 2023-03-30 PROCEDURE — 90471 IMMUNIZATION ADMIN: CPT | Performed by: INTERNAL MEDICINE

## 2023-03-30 PROCEDURE — 99214 OFFICE O/P EST MOD 30 MIN: CPT | Mod: 25 | Performed by: INTERNAL MEDICINE

## 2023-03-30 PROCEDURE — 85027 COMPLETE CBC AUTOMATED: CPT | Performed by: INTERNAL MEDICINE

## 2023-03-30 PROCEDURE — 80053 COMPREHEN METABOLIC PANEL: CPT | Performed by: INTERNAL MEDICINE

## 2023-03-30 PROCEDURE — 36415 COLL VENOUS BLD VENIPUNCTURE: CPT | Performed by: INTERNAL MEDICINE

## 2023-03-30 PROCEDURE — 80061 LIPID PANEL: CPT | Performed by: INTERNAL MEDICINE

## 2023-03-30 PROCEDURE — G0402 INITIAL PREVENTIVE EXAM: HCPCS | Performed by: INTERNAL MEDICINE

## 2023-03-30 PROCEDURE — G0103 PSA SCREENING: HCPCS | Performed by: INTERNAL MEDICINE

## 2023-03-30 PROCEDURE — 90677 PCV20 VACCINE IM: CPT | Performed by: INTERNAL MEDICINE

## 2023-03-30 PROCEDURE — 90472 IMMUNIZATION ADMIN EACH ADD: CPT | Performed by: INTERNAL MEDICINE

## 2023-03-30 RX ORDER — LOSARTAN POTASSIUM 100 MG/1
100 TABLET ORAL DAILY
Qty: 90 TABLET | Refills: 3 | Status: SHIPPED | OUTPATIENT
Start: 2023-03-30 | End: 2024-05-02

## 2023-03-30 RX ORDER — SILDENAFIL 25 MG/1
25 TABLET, FILM COATED ORAL DAILY PRN
Qty: 30 TABLET | Refills: 11 | Status: SHIPPED | OUTPATIENT
Start: 2023-03-30

## 2023-03-30 ASSESSMENT — ENCOUNTER SYMPTOMS
CHILLS: 0
SHORTNESS OF BREATH: 0
COUGH: 0
HEMATURIA: 0
MYALGIAS: 0
DIARRHEA: 0
HEMATOCHEZIA: 0
DYSURIA: 0
JOINT SWELLING: 0
SORE THROAT: 0
ARTHRALGIAS: 0
EYE PAIN: 0
WEAKNESS: 0
PALPITATIONS: 0
PARESTHESIAS: 0
NAUSEA: 0
FREQUENCY: 0
HEADACHES: 0
HEARTBURN: 0
FEVER: 0
NERVOUS/ANXIOUS: 0
ABDOMINAL PAIN: 0
DIZZINESS: 0
CONSTIPATION: 0

## 2023-03-30 ASSESSMENT — ACTIVITIES OF DAILY LIVING (ADL): CURRENT_FUNCTION: NO ASSISTANCE NEEDED

## 2023-03-30 ASSESSMENT — PAIN SCALES - GENERAL: PAINLEVEL: NO PAIN (0)

## 2023-03-30 NOTE — PROGRESS NOTES
"SUBJECTIVE:   Scott is a 67 year old who presents for Preventive Visit.  .Patient has been advised of split billing requirements and indicates understanding: Yes  Are you in the first 12 months of your Medicare coverage?  Yes,  Visual Acuity:  Right Eye: 10/20   Left Eye: 10/20  Both Eyes: 10/16    Healthy Habits:     In general, how would you rate your overall health?  Good    Frequency of exercise:  4-5 days/week    Duration of exercise:  15-30 minutes    Do you usually eat at least 4 servings of fruit and vegetables a day, include whole grains    & fiber and avoid regularly eating high fat or \"junk\" foods?  Yes    Taking medications regularly:  Yes    Medication side effects:  None    Ability to successfully perform activities of daily living:  No assistance needed    Home Safety:  No safety concerns identified    Hearing Impairment:  No hearing concerns    In the past 6 months, have you been bothered by leaking of urine?  No    In general, how would you rate your overall mental or emotional health?  Good      PHQ-2 Total Score: 0    Additional concerns today:  No      Have you ever done Advance Care Planning? (For example, a Health Directive, POLST, or a discussion with a medical provider or your loved ones about your wishes): No, advance care planning information given to patient to review.  Patient plans to discuss their wishes with loved ones or provider.         Fall risk  Fallen 2 or more times in the past year?: No  Any fall with injury in the past year?: No    Cognitive Screening   1) Repeat 3 items (Leader, Season, Table)    2) Clock draw: NORMAL  3) 3 item recall: Recalls 3 objects  Results: 3 items recalled: COGNITIVE IMPAIRMENT LESS LIKELY    Mini-CogTM Copyright STACIE Welch. Licensed by the author for use in [facility]; reprinted with permission (samir@.[facility]). All rights reserved.      Do you have sleep apnea, excessive snoring or daytime drowsiness?: no    Reviewed and updated as needed " this visit by clinical staff   Tobacco  Allergies  Meds   Med Hx  Surg Hx  Fam Hx          Reviewed and updated as needed this visit by Provider   Tobacco     Med Hx  Surg Hx  Fam Hx         Social History     Tobacco Use     Smoking status: Never     Smokeless tobacco: Never   Substance Use Topics     Alcohol use: Yes     Alcohol/week: 0.0 standard drinks     Comment: 4-5 drinks per week         Alcohol Use 3/30/2023   Prescreen: >3 drinks/day or >7 drinks/week? No       Current providers sharing in care for this patient include:   Patient Care Team:  Jose L Eason MD as PCP - General (Internal Medicine)  Yoly Obregon MD as MD (Dermatology)  Jose L Eason MD as Assigned PCP    The following health maintenance items are reviewed in Epic and correct as of today:  Health Maintenance   Topic Date Due     ZOSTER IMMUNIZATION (1 of 2) Never done     Pneumococcal Vaccine: 65+ Years (1 - PCV) Never done     AORTIC ANEURYSM SCREENING (SYSTEM ASSIGNED)  Never done     DTAP/TDAP/TD IMMUNIZATION (2 - Td or Tdap) 09/21/2022     MEDICARE ANNUAL WELLNESS VISIT  03/30/2024     ANNUAL REVIEW OF HM ORDERS  03/30/2024     FALL RISK ASSESSMENT  03/30/2024     COLORECTAL CANCER SCREENING  01/31/2025     LIPID  07/12/2026     ADVANCE CARE PLANNING  03/30/2028     HEPATITIS C SCREENING  Completed     HIV SCREENING  Completed     PHQ-2 (once per calendar year)  Completed     INFLUENZA VACCINE  Completed     COVID-19 Vaccine  Completed     IPV IMMUNIZATION  Aged Out     MENINGITIS IMMUNIZATION  Aged Out     Patient Active Problem List   Diagnosis     HTN (hypertension)     Advanced directives, counseling/discussion     Hyperlipidemia LDL goal <100     Cervical radiculopathy     Neck pain     Intrinsic eczema     Past Surgical History:   Procedure Laterality Date     COLONOSCOPY       COLONOSCOPY N/A 1/31/2020    Procedure: COLONOSCOPY, WITH POLYPECTOMY AND BIOPSY;  Surgeon: Rasta Thompson MD;  Location:  GI      ENT SURGERY      Tonsillectomy       Social History     Tobacco Use     Smoking status: Never     Smokeless tobacco: Never   Substance Use Topics     Alcohol use: Yes     Alcohol/week: 0.0 standard drinks     Comment: 4-5 drinks per week     Family History   Problem Relation Age of Onset     Allergies Father          in 30s     Parkinsonism Mother      Diabetes No family hx of      Cancer - colorectal No family hx of      Prostate Cancer No family hx of      Skin Cancer No family hx of         No known family hx of skin cancer     Melanoma No family hx of          Current Outpatient Medications   Medication Sig Dispense Refill     atorvastatin (LIPITOR) 10 MG tablet Take 1 tablet (10 mg) by mouth daily 90 tablet 3     betamethasone dipropionate (DIPROSONE) 0.05 % ointment Apply to stubborn eczema on hands 2 to 3 times weekly as needed 60 g 1     clotrimazole (LOTRIMIN) 1 % cream Apply topically 2 times daily 113 g 1     losartan (COZAAR) 100 MG tablet Take 1 tablet (100 mg) by mouth daily 90 tablet 3     Multiple Vitamin (MULTI-VITAMIN) per tablet Take 1 tablet by mouth daily.       olopatadine (PATADAY) 0.2 % ophthalmic solution Place 1 drop into both eyes daily 2.5 mL 3     sildenafil (VIAGRA) 25 MG tablet Take 1 tablet (25 mg) by mouth daily as needed 30 tablet 11     triamcinolone (KENALOG) 0.1 % external ointment Apply topically 2 times daily 454 g 1     Allergies   Allergen Reactions     No Known Allergies              Review of Systems   Constitutional: Negative for chills and fever.   HENT: Negative for congestion, ear pain, hearing loss and sore throat.    Eyes: Negative for pain and visual disturbance.   Respiratory: Negative for cough and shortness of breath.    Cardiovascular: Negative for chest pain, palpitations and peripheral edema.   Gastrointestinal: Negative for abdominal pain, constipation, diarrhea, heartburn, hematochezia and nausea.   Genitourinary: Positive for impotence. Negative for  "dysuria, frequency, genital sores, hematuria, penile discharge and urgency.   Musculoskeletal: Negative for arthralgias, joint swelling and myalgias.   Skin: Negative for rash.   Neurological: Negative for dizziness, weakness, headaches and paresthesias.   Psychiatric/Behavioral: Negative for mood changes. The patient is not nervous/anxious.          OBJECTIVE:   BP (!) 154/94 (BP Location: Right arm, Patient Position: Sitting, Cuff Size: Adult Large)   Pulse 62   Temp 97.4  F (36.3  C) (Oral)   Resp 16   Ht 1.753 m (5' 9\")   Wt 92.4 kg (203 lb 11.2 oz)   SpO2 97%   BMI 30.08 kg/m   Estimated body mass index is 30.08 kg/m  as calculated from the following:    Height as of this encounter: 1.753 m (5' 9\").    Weight as of this encounter: 92.4 kg (203 lb 11.2 oz).  Physical Exam  GENERAL: healthy, alert and no distress  EYES: Eyes grossly normal to inspection, PERRL and conjunctivae and sclerae normal  HENT: ear canals and TM's normal, nose and mouth without ulcers or lesions  NECK: no adenopathy, no asymmetry, masses, or scars and thyroid normal to palpation  RESP: lungs clear to auscultation - no rales, rhonchi or wheezes  CV: regular rate and rhythm, normal S1 S2, no S3 or S4, no murmur, click or rub, no peripheral edema and peripheral pulses strong  ABDOMEN: soft, nontender, no hepatosplenomegaly, no masses and bowel sounds normal  RECTAL: normal sphincter tone, no rectal masses, prostate normal size, smooth, nontender without nodules or masses  MS: no gross musculoskeletal defects noted, no edema  SKIN: no suspicious lesions or rashes  NEURO: Normal strength and tone, mentation intact and speech normal  PSYCH: mentation appears normal, affect normal/bright    Labs pending     ASSESSMENT / PLAN:       ICD-10-CM    1. Routine general medical examination at a health care facility  Z00.00 Lipid panel reflex to direct LDL Fasting     Comprehensive metabolic panel     CBC with platelets     PROSTATE SPEC ANTIGEN " SCREEN      2. Primary hypertension  I10 OFFICE/OUTPT VISIT,EST,LEVL III      3. Hyperlipidemia LDL goal <100  E78.5 OFFICE/OUTPT VISIT,EST,LEVL III      4. Screening for AAA (abdominal aortic aneurysm)  Z13.6 US Aorta Medicare AAA Screening      5. Erectile dysfunction, unspecified erectile dysfunction type  N52.9 sildenafil (VIAGRA) 25 MG tablet     OFFICE/OUTPT VISIT,EST,LEVL III      6. Essential hypertension  I10 losartan (COZAAR) 100 MG tablet      blood pressure too high ; increase the losartan from 50 to 100  Recheck lipids  Can try viagra, side effects and risks discussed      Patient has been advised of split billing requirements and indicates understanding: Yes      COUNSELING:  Reviewed preventive health counseling, as reflected in patient instructions  Special attention given to:       Consider AAA screening for ages 65-75 and smoking history; referred for exam        Regular exercise       Healthy diet/nutrition       Immunizations    PCV 20 today; update Td; reminded shingrix            Hepatitis C screening       HIV screening for high risk patient       Consider lung cancer screening for ages 55-80 years (77 for Medicare) and 20 pack-year smoking history ; never smoker        Colon cancer screening; repeat 1/2025       Prostate cancer screening; PSA        He reports that he has never smoked. He has never used smokeless tobacco.      Appropriate preventive services were discussed with this patient, including applicable screening as appropriate for cardiovascular disease, diabetes, osteopenia/osteoporosis, and glaucoma.  As appropriate for age/gender, discussed screening for colorectal cancer, prostate cancer, breast cancer, and cervical cancer. Checklist reviewing preventive services available has been given to the patient.    Reviewed patients plan of care and provided an AVS. The Basic Care Plan (routine screening as documented in Health Maintenance) for Scott meets the Care Plan requirement.  This Care Plan has been established and reviewed with the Patient.      Jose L Eason MD  [facility]    Identified Health Risks:

## 2023-03-30 NOTE — PATIENT INSTRUCTIONS
Make sure your home blood pressure cuff  checks your blood pressure on your arm not  your wrist.  Omron is a good brand.    Take your blood pressure after 5 minutes of rest  in the AM and PM for one week and record the  readings (14 total readings).  Make sure your feet are flat on the floor and your back is supported when you check.    Send me a [employer] message with the average of your top readings (Systolic blood pressures) and the average of your bottom readings (Diastolic blood pressures).  Our goal is for this average to be less than 130/80.      DASH diet (diet approaches to stop hypertension)

## 2023-03-30 NOTE — PROGRESS NOTES
Prior to immunization administration, verified patients identity using patient s name and date of birth. Please see Immunization Activity for additional information.     Screening Questionnaire for Adult Immunization    Are you sick today?   No   Do you have allergies to medications, food, a vaccine component or latex?   No   Have you ever had a serious reaction after receiving a vaccination?   No   Do you have a long-term health problem with heart, lung, kidney, or metabolic disease (e.g., diabetes), asthma, a blood disorder, no spleen, complement component deficiency, a cochlear implant, or a spinal fluid leak?  Are you on long-term aspirin therapy?   No   Do you have cancer, leukemia, HIV/AIDS, or any other immune system problem?   No   Do you have a parent, brother, or sister with an immune system problem?   No   In the past 3 months, have you taken medications that affect  your immune system, such as prednisone, other steroids, or anticancer drugs; drugs for the treatment of rheumatoid arthritis, Crohn s disease, or psoriasis; or have you had radiation treatments?   No   Have you had a seizure, or a brain or other nervous system problem?   No   During the past year, have you received a transfusion of blood or blood    products, or been given immune (gamma) globulin or antiviral drug?   No   For women: Are you pregnant or is there a chance you could become       pregnant during the next month?   No   Have you received any vaccinations in the past 4 weeks?   No     Immunization questionnaire answers were all negative. PONCHO Muñoz present      Injection of Td & PCV20 given by Tiffany Read MA. Patient instructed to remain in clinic for 15 minutes afterwards, and to report any adverse reactions.     Screening performed by Tiffany Read MA on 3/30/2023 at 2:55 PM.

## 2023-03-31 NOTE — RESULT ENCOUNTER NOTE
The following letter pertains to your most recent diagnostic tests:    -Your cholesterol panel looks OK for you.  Keep taking atorvastatin (Lipitor) as you are.       -Liver and gallbladder tests are normal for you. (ALT,AST, Alk phos, bilirubin), kidney function is normal for you (Creatinine, GFR), Sodium is normal, Potassium is normal for you, Calcium is normal for you, Glucose (blood sugar) is normal for you.      -Your prostate specific antigen (PSA) test result returned normal.     -Your complete blood counts including your hemoglobin returned normal for you.             Sincerely,    Dr. Eason

## 2023-04-05 ENCOUNTER — ANCILLARY PROCEDURE (OUTPATIENT)
Dept: ULTRASOUND IMAGING | Facility: CLINIC | Age: 68
End: 2023-04-05
Attending: INTERNAL MEDICINE
Payer: COMMERCIAL

## 2023-04-05 DIAGNOSIS — Z13.6 SCREENING FOR AAA (ABDOMINAL AORTIC ANEURYSM): ICD-10-CM

## 2023-04-05 PROBLEM — I71.40 ABDOMINAL AORTIC ANEURYSM (AAA) WITHOUT RUPTURE, UNSPECIFIED PART (H): Status: ACTIVE | Noted: 2023-04-05

## 2023-04-05 PROBLEM — N52.9 ERECTILE DYSFUNCTION, UNSPECIFIED ERECTILE DYSFUNCTION TYPE: Status: ACTIVE | Noted: 2023-04-05

## 2023-04-05 PROCEDURE — 76706 US ABDL AORTA SCREEN AAA: CPT

## 2023-04-05 NOTE — RESULT ENCOUNTER NOTE
The following letter pertains to your most recent diagnostic tests:    Well, I am glad that we obtained the ultrasound to screen for abdominal aneurysm.  You actually have minimal widening of the abdominal aorta.  The current diameter of the aorta is certainly not wide enough to warrant any intervention at this time, and, truth be told, your aorta in all likelihood may never get wide enough to be at risk for rupture (we start to worry when the diameter is greater than 5.5 cm and yours is 3.0 to 3.9 cm in diameter).  That being said, this situation requires careful surveillance.   With this degree of dilation another ultrasound in 3 years is warranted.   We should obtain another ultrasound in April of 2026 to take another look.  Until then, careful control of blood pressure is the best way to slow the progression of aortic dilation.  Let me know if the new losartan dose does not achieve our gaol of blood pressure less than 130/80.          Sincerely,    Dr. Eason

## 2023-06-05 ENCOUNTER — MYC MEDICAL ADVICE (OUTPATIENT)
Dept: FAMILY MEDICINE | Facility: CLINIC | Age: 68
End: 2023-06-05
Payer: COMMERCIAL

## 2023-06-05 DIAGNOSIS — H10.13 ALLERGIC CONJUNCTIVITIS, BILATERAL: ICD-10-CM

## 2023-06-05 RX ORDER — OLOPATADINE HYDROCHLORIDE 2 MG/ML
1 SOLUTION/ DROPS OPHTHALMIC DAILY
Qty: 2.5 ML | Refills: 3 | Status: SHIPPED | OUTPATIENT
Start: 2023-06-05

## 2023-09-02 DIAGNOSIS — E78.5 HYPERLIPIDEMIA LDL GOAL <130: ICD-10-CM

## 2023-09-06 RX ORDER — ATORVASTATIN CALCIUM 10 MG/1
10 TABLET, FILM COATED ORAL DAILY
Qty: 90 TABLET | Refills: 0 | Status: SHIPPED | OUTPATIENT
Start: 2023-09-06 | End: 2023-12-11

## 2023-12-10 DIAGNOSIS — E78.5 HYPERLIPIDEMIA LDL GOAL <130: ICD-10-CM

## 2023-12-11 RX ORDER — ATORVASTATIN CALCIUM 10 MG/1
10 TABLET, FILM COATED ORAL DAILY
Qty: 90 TABLET | Refills: 0 | Status: SHIPPED | OUTPATIENT
Start: 2023-12-11 | End: 2024-03-11

## 2024-02-13 ENCOUNTER — IMMUNIZATION (OUTPATIENT)
Dept: NURSING | Facility: CLINIC | Age: 69
End: 2024-02-13
Payer: COMMERCIAL

## 2024-02-13 PROCEDURE — 90471 IMMUNIZATION ADMIN: CPT

## 2024-02-13 PROCEDURE — 90662 IIV NO PRSV INCREASED AG IM: CPT

## 2024-02-13 PROCEDURE — 91320 SARSCV2 VAC 30MCG TRS-SUC IM: CPT

## 2024-02-13 PROCEDURE — 90480 ADMN SARSCOV2 VAC 1/ONLY CMP: CPT

## 2024-02-21 NOTE — TELEPHONE ENCOUNTER
Medication has been approved for 90 day supply  Patient needs to establish care with new provider.    Team Coordinators: Please call patient and offer to schedule for future appointment with new doc    Thank you!    RAUL Townsend    losartan (COZAAR) 50 MG tablet      Last Written Prescription Date: 1/22/17  Last Fill Quantity: 90, # refills: 1  [place] [facility] or [facility] prescribing provider: 1/11/17       Potassium   Date Value Ref Range Status   01/11/2017 4.1 3.4 - 5.3 mmol/L Final     Creatinine   Date Value Ref Range Status   01/11/2017 1.00 0.66 - 1.25 mg/dL Final     BP Readings from Last 3 Encounters:   01/11/17 117/76   11/12/15 121/84   10/23/15 123/86        Acute Cristy Stroke

## 2024-03-10 DIAGNOSIS — E78.5 HYPERLIPIDEMIA LDL GOAL <130: ICD-10-CM

## 2024-03-11 RX ORDER — ATORVASTATIN CALCIUM 10 MG/1
10 TABLET, FILM COATED ORAL DAILY
Qty: 90 TABLET | Refills: 0 | Status: SHIPPED | OUTPATIENT
Start: 2024-03-11 | End: 2024-04-02

## 2024-03-11 NOTE — TELEPHONE ENCOUNTER
Prescription approved per [facility] Refill Protocol.  Katerina Castro RN  [facility]

## 2024-04-01 ENCOUNTER — OFFICE VISIT (OUTPATIENT)
Dept: FAMILY MEDICINE | Facility: CLINIC | Age: 69
End: 2024-04-01
Attending: INTERNAL MEDICINE
Payer: COMMERCIAL

## 2024-04-01 VITALS
RESPIRATION RATE: 16 BRPM | DIASTOLIC BLOOD PRESSURE: 93 MMHG | TEMPERATURE: 97.2 F | BODY MASS INDEX: 30.11 KG/M2 | OXYGEN SATURATION: 100 % | WEIGHT: 203.3 LBS | HEART RATE: 68 BPM | HEIGHT: 69 IN | SYSTOLIC BLOOD PRESSURE: 148 MMHG

## 2024-04-01 DIAGNOSIS — N52.9 ERECTILE DYSFUNCTION, UNSPECIFIED ERECTILE DYSFUNCTION TYPE: ICD-10-CM

## 2024-04-01 DIAGNOSIS — Z00.00 ROUTINE GENERAL MEDICAL EXAMINATION AT A HEALTH CARE FACILITY: Primary | ICD-10-CM

## 2024-04-01 DIAGNOSIS — Z12.5 SCREENING FOR PROSTATE CANCER: ICD-10-CM

## 2024-04-01 DIAGNOSIS — E78.5 HYPERLIPIDEMIA LDL GOAL <100: ICD-10-CM

## 2024-04-01 DIAGNOSIS — E78.5 HYPERLIPIDEMIA LDL GOAL <130: ICD-10-CM

## 2024-04-01 DIAGNOSIS — I10 PRIMARY HYPERTENSION: ICD-10-CM

## 2024-04-01 DIAGNOSIS — I71.40 ABDOMINAL AORTIC ANEURYSM (AAA) WITHOUT RUPTURE, UNSPECIFIED PART (H): ICD-10-CM

## 2024-04-01 LAB
ALBUMIN SERPL BCG-MCNC: 4.4 G/DL (ref 3.5–5.2)
ALP SERPL-CCNC: 59 U/L (ref 40–150)
ALT SERPL W P-5'-P-CCNC: 28 U/L (ref 0–70)
ANION GAP SERPL CALCULATED.3IONS-SCNC: 11 MMOL/L (ref 7–15)
AST SERPL W P-5'-P-CCNC: 25 U/L (ref 0–45)
BILIRUB SERPL-MCNC: 0.6 MG/DL
BUN SERPL-MCNC: 16.8 MG/DL (ref 8–23)
CALCIUM SERPL-MCNC: 9.3 MG/DL (ref 8.8–10.2)
CHLORIDE SERPL-SCNC: 103 MMOL/L (ref 98–107)
CHOLEST SERPL-MCNC: 205 MG/DL
CREAT SERPL-MCNC: 1.1 MG/DL (ref 0.67–1.17)
DEPRECATED HCO3 PLAS-SCNC: 26 MMOL/L (ref 22–29)
EGFRCR SERPLBLD CKD-EPI 2021: 74 ML/MIN/1.73M2
ERYTHROCYTE [DISTWIDTH] IN BLOOD BY AUTOMATED COUNT: 13 % (ref 10–15)
FASTING STATUS PATIENT QL REPORTED: YES
GLUCOSE SERPL-MCNC: 101 MG/DL (ref 70–99)
HBA1C MFR BLD: 5.5 % (ref 0–5.6)
HCT VFR BLD AUTO: 48.3 % (ref 40–53)
HDLC SERPL-MCNC: 58 MG/DL
HGB BLD-MCNC: 15.9 G/DL (ref 13.3–17.7)
LDLC SERPL CALC-MCNC: 125 MG/DL
MCH RBC QN AUTO: 31 PG (ref 26.5–33)
MCHC RBC AUTO-ENTMCNC: 32.9 G/DL (ref 31.5–36.5)
MCV RBC AUTO: 94 FL (ref 78–100)
NONHDLC SERPL-MCNC: 147 MG/DL
PLATELET # BLD AUTO: 184 10E3/UL (ref 150–450)
POTASSIUM SERPL-SCNC: 5.1 MMOL/L (ref 3.4–5.3)
PROT SERPL-MCNC: 7 G/DL (ref 6.4–8.3)
PSA SERPL DL<=0.01 NG/ML-MCNC: 0.37 NG/ML (ref 0–4.5)
RBC # BLD AUTO: 5.13 10E6/UL (ref 4.4–5.9)
SODIUM SERPL-SCNC: 140 MMOL/L (ref 135–145)
TRIGL SERPL-MCNC: 108 MG/DL
WBC # BLD AUTO: 5.2 10E3/UL (ref 4–11)

## 2024-04-01 PROCEDURE — 99214 OFFICE O/P EST MOD 30 MIN: CPT | Mod: 25 | Performed by: INTERNAL MEDICINE

## 2024-04-01 PROCEDURE — 80061 LIPID PANEL: CPT | Performed by: INTERNAL MEDICINE

## 2024-04-01 PROCEDURE — 85027 COMPLETE CBC AUTOMATED: CPT | Performed by: INTERNAL MEDICINE

## 2024-04-01 PROCEDURE — 99397 PER PM REEVAL EST PAT 65+ YR: CPT | Performed by: INTERNAL MEDICINE

## 2024-04-01 PROCEDURE — 36415 COLL VENOUS BLD VENIPUNCTURE: CPT | Performed by: INTERNAL MEDICINE

## 2024-04-01 PROCEDURE — 83036 HEMOGLOBIN GLYCOSYLATED A1C: CPT | Performed by: INTERNAL MEDICINE

## 2024-04-01 PROCEDURE — G0103 PSA SCREENING: HCPCS | Performed by: INTERNAL MEDICINE

## 2024-04-01 PROCEDURE — 80053 COMPREHEN METABOLIC PANEL: CPT | Performed by: INTERNAL MEDICINE

## 2024-04-01 RX ORDER — AMLODIPINE BESYLATE 5 MG/1
5 TABLET ORAL DAILY
Qty: 90 TABLET | Refills: 3 | Status: SHIPPED | OUTPATIENT
Start: 2024-04-01

## 2024-04-01 SDOH — HEALTH STABILITY: PHYSICAL HEALTH: ON AVERAGE, HOW MANY DAYS PER WEEK DO YOU ENGAGE IN MODERATE TO STRENUOUS EXERCISE (LIKE A BRISK WALK)?: 6 DAYS

## 2024-04-01 SDOH — HEALTH STABILITY: PHYSICAL HEALTH: ON AVERAGE, HOW MANY MINUTES DO YOU ENGAGE IN EXERCISE AT THIS LEVEL?: 30 MIN

## 2024-04-01 ASSESSMENT — PAIN SCALES - GENERAL: PAINLEVEL: NO PAIN (0)

## 2024-04-01 ASSESSMENT — SOCIAL DETERMINANTS OF HEALTH (SDOH): HOW OFTEN DO YOU GET TOGETHER WITH FRIENDS OR RELATIVES?: TWICE A WEEK

## 2024-04-01 NOTE — PATIENT INSTRUCTIONS
Make sure your home blood pressure cuff checks your blood pressure on your arm not on your wrist.  Wrist blood pressure cuffs are NOT accurate.  Omron is a good blood pressure cuff brand.    Take your blood pressure after 5 minutes of rest in the AM and PM for 3 days and record the readings (6 total readings).  Make sure your feet are flat on the floor and your back is supported when you check your blood pressure.  If the initial blood pressure reading is too high, wait another 5 minutes and check again and record the better of the two readings.      Send me a [employer] message with the average of your top readings (Systolic blood pressures) and the average of your bottom readings (Diastolic blood pressures).  If you don't want to do the math, you can just send me the 6 readings.        Our goal is for this average to be less than 130/80.        Preventive Care Advice   This is general advice given by our system to help you stay healthy. However, your care team may have specific advice just for you. Please talk to your care team about your preventive care needs.  Nutrition  Eat 5 or more servings of fruits and vegetables each day.  Try wheat bread, brown rice and whole grain pasta (instead of white bread, rice, and pasta).  Get enough calcium and vitamin D. Check the label on foods and aim for 100% of the RDA (recommended daily allowance).  Lifestyle  Exercise at least 150 minutes each week   (30 minutes a day, 5 days a week).  Do muscle strengthening activities 2 days a week. These help control your weight and prevent disease.  No smoking.  Wear sunscreen to prevent skin cancer.  Have a dental exam and cleaning every 6 months.  Yearly exams  See your health care team every year to talk about:  Any changes in your health.  Any medicines your care team has prescribed.  Preventive care, family planning, and ways to prevent chronic diseases.  Shots (vaccines)   HPV shots (up to age 26), if you've never had them  What Type Of Note Output Would You Prefer (Optional)?: Bullet Format How Severe Is Your Skin Lesion?: mild before.  Hepatitis B shots (up to age 59), if you've never had them before.  COVID-19 shot: Get this shot when it's due.  Flu shot: Get a flu shot every year.  Tetanus shot: Get a tetanus shot every 10 years.  Pneumococcal, hepatitis A, and RSV shots: Ask your care team if you need these based on your risk.  Shingles shot (for age 50 and up).  General health tests  Diabetes screening:  Starting at age 35, Get screened for diabetes at least every 3 years.  If you are younger than age 35, ask your care team if you should be screened for diabetes.  Cholesterol test: At age 39, start having a cholesterol test every 5 years, or more often if advised.  Bone density scan (DEXA): At age 50, ask your care team if you should have this scan for osteoporosis (brittle bones).  Hepatitis C: Get tested at least once in your life.  STIs (sexually transmitted infections)  Before age 24: Ask your care team if you should be screened for STIs.  After age 24: Get screened for STIs if you're at risk. You are at risk for STIs (including HIV) if:  You are sexually active with more than one person.  You don't use condoms every time.  You or a partner was diagnosed with a sexually transmitted infection.  If you are at risk for HIV, ask about PrEP medicine to prevent HIV.  Get tested for HIV at least once in your life, whether you are at risk for HIV or not.  Cancer screening tests  Cervical cancer screening: If you have a cervix, begin getting regular cervical cancer screening tests at age 21. Most people who have regular screenings with normal results can stop after age 65. Talk about this with your provider.  Breast cancer scan (mammogram): If you've ever had breasts, begin having regular mammograms starting at age 40. This is a scan to check for breast cancer.  Colon cancer screening: It is important to start screening for colon cancer at age 45.  Have a colonoscopy test every 10 years (or more often if you're at risk) Or, ask your  Has Your Skin Lesion Been Treated?: not been treated provider about stool tests like a FIT test every year or Cologuard test every 3 years.  To learn more about your testing options, visit: https://www.[employer] Elite/234193.pdf.  For help making a decision, visit: https://bit.ly/in13646.  Prostate cancer screening test: If you have a prostate and are age 55 to 69, ask your provider if you would benefit from a yearly prostate cancer screening test.  Lung cancer screening: If you are a current or former smoker age 50 to 80, ask your care team if ongoing lung cancer screenings are right for you.  For informational purposes only. Not to replace the advice of your health care provider. Copyright   2023 [facility] [employer]. All rights reserved. Clinically reviewed by the [facility]. Backlift [id number] - REV 01/24.    Learning About Stress  What is stress?     Stress is your body's response to a hard situation. Your body can have a physical, emotional, or mental response. Stress is a fact of life for most people, and it affects everyone differently. What causes stress for you may not be stressful for someone else.  A lot of things can cause stress. You may feel stress when you go on a job interview, take a test, or run a race. This kind of short-term stress is normal and even useful. It can help you if you need to work hard or react quickly. For example, stress can help you finish an important job on time.  Long-term stress is caused by ongoing stressful situations or events. Examples of long-term stress include long-term health problems, ongoing problems at work, or conflicts in your family. Long-term stress can harm your health.  How does stress affect your health?  When you are stressed, your body responds as though you are in danger. It makes hormones that speed up your heart, make you breathe faster, and give you a burst of energy. This is called the fight-or-flight stress response. If the stress is over quickly, your body goes back to normal  Is This A New Presentation, Or A Follow-Up?: Skin Lesion and no harm is done.  But if stress happens too often or lasts too long, it can have bad effects. Long-term stress can make you more likely to get sick, and it can make symptoms of some diseases worse. If you tense up when you are stressed, you may develop neck, shoulder, or low back pain. Stress is linked to high blood pressure and heart disease.  Stress also harms your emotional health. It can make you montes de oca, tense, or depressed. Your relationships may suffer, and you may not do well at work or school.  What can you do to manage stress?  You can try these things to help manage stress:   Do something active. Exercise or activity can help reduce stress. Walking is a great way to get started. Even everyday activities such as housecleaning or yard work can help.  Try yoga or hugh chi. These techniques combine exercise and meditation. You may need some training at first to learn them.  Do something you enjoy. For example, listen to music or go to a movie. Practice your hobby or do volunteer work.  Meditate. This can help you relax, because you are not worrying about what happened before or what may happen in the future.  Do guided imagery. Imagine yourself in any setting that helps you feel calm. You can use online videos, books, or a teacher to guide you.  Do breathing exercises. For example:  From a standing position, bend forward from the waist with your knees slightly bent. Let your arms dangle close to the floor.  Breathe in slowly and deeply as you return to a standing position. Roll up slowly and lift your head last.  Hold your breath for just a few seconds in the standing position.  Breathe out slowly and bend forward from the waist.  Let your feelings out. Talk, laugh, cry, and express anger when you need to. Talking with supportive friends or family, a counselor, or a milady leader about your feelings is a healthy way to relieve stress. Avoid discussing your feelings with people who make you feel worse.  Write.  "It may help to write about things that are bothering you. This helps you find out how much stress you feel and what is causing it. When you know this, you can find better ways to cope.  What can you do to prevent stress?  You might try some of these things to help prevent stress:  Manage your time. This helps you find time to do the things you want and need to do.  Get enough sleep. Your body recovers from the stresses of the day while you are sleeping.  Get support. Your family, friends, and community can make a difference in how you experience stress.  Limit your news feed. Avoid or limit time on social media or news that may make you feel stressed.  Do something active. Exercise or activity can help reduce stress. Walking is a great way to get started.  Where can you learn more?  Go to https://www.[employer]unt [employer].net/patiented  Enter N032 in the search box to learn more about \"Learning About Stress.\"  Current as of: October 24, 2023               Content Version: 14.0    5843-0667 [employer].   Care instructions adapted under license by your healthcare professional. If you have questions about a medical condition or this instruction, always ask your healthcare professional. [employer] disclaims any warranty or liability for your use of this information.      " Additional History: Pain level:0/10 Which Family Member (Optional)?: Father

## 2024-04-01 NOTE — PROGRESS NOTES
"Preventive Care Visit  [facility]  Jose L Eason MD, Internal Medicine  Apr 1, 2024      Assessment & Plan     Routine general medical examination at a health care facility    - Comprehensive metabolic panel; Future  - CBC with platelets; Future  - HEMOGLOBIN A1C; Future    Hyperlipidemia LDL goal <100  On statin therapy recheck   - Lipid panel reflex to direct LDL Non-fasting; Future    Primary hypertension  Not at goal  Add amlodipine to 100 mg of losartan  Check home reading and communicate results as per patient instructions  Side effects and risks of amlodipine discussed   - amLODIPine (NORVASC) 5 MG tablet; Take 1 tablet (5 mg) by mouth daily    Abdominal aortic aneurysm (AAA) without rupture, unspecified part (H24)  Recheck in 2026 by ultrasound     Erectile dysfunction, unspecified erectile dysfunction type      Screening for prostate cancer    - PROSTATE SPEC ANTIGEN SCREEN; Future        No LOS data to display   Time spent by me doing chart review, history and exam, documentation and further activities per the note      BMI  Estimated body mass index is 30.02 kg/m  as calculated from the following:    Height as of this encounter: 1.753 m (5' 9\").    Weight as of this encounter: 92.2 kg (203 lb 4.8 oz).   Weight management plan: Discussed healthy diet and exercise guidelines    Counseling  Appropriate preventive services were discussed with this patient, including applicable screening as appropriate for fall prevention, nutrition, physical activity, Tobacco-use cessation, weight loss and cognition.  Checklist reviewing preventive services available has been given to the patient.  Reviewed patient's diet, addressing concerns and/or questions.   He is at risk for psychosocial distress and has been provided with information to reduce risk.       FUTURE APPOINTMENTS:       - Follow-up for annual visit or as needed    Waqas Chavez is a 68 year old, presenting for the " following:  Physical         Health Care Directive  Patient does not have a Health Care Directive or Living Will: Advance Directive received and scanned. Click on Code in the patient header to view.    HPI              4/1/2024   General Health   How would you rate your overall physical health? Good   Feel stress (tense, anxious, or unable to sleep) Only a little   (!) STRESS CONCERN      4/1/2024   Nutrition   Diet: Low salt         4/1/2024   Exercise   Days per week of moderate/strenous exercise 6 days   Average minutes spent exercising at this level 30 min         4/1/2024   Social Factors   Frequency of gathering with friends or relatives Twice a week   Worry food won't last until get money to buy more No   Food not last or not have enough money for food? No   Do you have housing?  Yes   Are you worried about losing your housing? No   Lack of transportation? No   Unable to get utilities (heat,electricity)? No         4/1/2024   Activities of Daily Living- Home Safety   Needs help with the following daily activites None of the above   Safety concerns in the home None of the above         4/1/2024   Dental   Dentist two times every year? Yes         4/1/2024   Hearing Screening   Hearing concerns? None of the above         4/1/2024   Driving Risk Screening   Patient/family members have concerns about driving No         4/1/2024   General Alertness/Fatigue Screening   Have you been more tired than usual lately? No         4/1/2024   Urinary Incontinence Screening   Bothered by leaking urine in past 6 months No         4/1/2024   TB Screening   Were you born outside of the [place]? No         Today's PHQ-2 Score:       4/1/2024     8:41 AM   PHQ-2 ( 1999 Pfizer)   Q1: Little interest or pleasure in doing things 0   Q2: Feeling down, depressed or hopeless 0   PHQ-2 Score 0   Q1: Little interest or pleasure in doing things Not at all   Q2: Feeling down, depressed or hopeless Not at all   PHQ-2 Score 0           4/1/2024    Substance Use   Alcohol more than 3/day or more than 7/wk No   Do you have a current opioid prescription? No   How severe/bad is pain from 1 to 10? 0/10 (No Pain)   Do you use any other substances recreationally? No     Social History     Tobacco Use    Smoking status: Never    Smokeless tobacco: Never   Vaping Use    Vaping Use: Never used   Substance Use Topics    Alcohol use: Yes     Comment: 4-5 drinks per week    Drug use: No           4/1/2024   AAA Screening   Family history of Abdominal Aortic Aneurysm (AAA)? No   Last PSA:   PSA   Date Value Ref Range Status   02/26/2020 0.85 0 - 4 ug/L Final     Comment:     Assay Method:  Chemiluminescence using Siemens Vista analyzer     Prostate Specific Antigen Screen   Date Value Ref Range Status   03/30/2023 0.28 0.00 - 4.50 ng/mL Final   07/12/2021 0.57 0.00 - 4.00 ug/L Final     ASCVD Risk   The 10-year ASCVD risk score (Deejay CLINTON, et al., 2019) is: 17%    Values used to calculate the score:      Age: 68 years      Sex: Male      Is Non- : No      Diabetic: No      Tobacco smoker: No      Systolic Blood Pressure: 148 mmHg      Is BP treated: Yes      HDL Cholesterol: 62 mg/dL      Total Cholesterol: 183 mg/dL            Reviewed and updated as needed this visit by Provider                    Past Medical History:   Diagnosis Date    HTN (hypertension) 09/21/2012    Hyperlipidemia LDL goal <130 09/22/2012    Intrinsic eczema 12/20/2017     Past Surgical History:   Procedure Laterality Date    COLONOSCOPY      COLONOSCOPY N/A 1/31/2020    Procedure: COLONOSCOPY, WITH POLYPECTOMY AND BIOPSY;  Surgeon: Rasta Thompson MD;  Location:  GI    ENT SURGERY      Tonsillectomy     BP Readings from Last 3 Encounters:   04/01/24 (!) 148/93   03/30/23 (!) 154/94   07/12/21 123/78    Wt Readings from Last 3 Encounters:   04/01/24 92.2 kg (203 lb 4.8 oz)   03/30/23 92.4 kg (203 lb 11.2 oz)   07/12/21 90 kg (198 lb 6.4 oz)                   Patient Active Problem List   Diagnosis    HTN (hypertension)    Advanced directives, counseling/discussion    Hyperlipidemia LDL goal <100    Cervical radiculopathy    Neck pain    Intrinsic eczema    Abdominal aortic aneurysm (AAA) without rupture, unspecified part (H24)    Erectile dysfunction, unspecified erectile dysfunction type     Past Surgical History:   Procedure Laterality Date    COLONOSCOPY      COLONOSCOPY N/A 2020    Procedure: COLONOSCOPY, WITH POLYPECTOMY AND BIOPSY;  Surgeon: Rasta Thompson MD;  Location:  GI    ENT SURGERY      Tonsillectomy       Social History     Tobacco Use    Smoking status: Never    Smokeless tobacco: Never   Substance Use Topics    Alcohol use: Yes     Comment: 4-5 drinks per week     Family History   Problem Relation Age of Onset    Allergies Father          in 30s    Parkinsonism Mother     Diabetes Sister     Cancer - colorectal No family hx of     Prostate Cancer No family hx of     Skin Cancer No family hx of         No known family hx of skin cancer    Melanoma No family hx of          Current Outpatient Medications   Medication Sig Dispense Refill    atorvastatin (LIPITOR) 10 MG tablet TAKE 1 TABLET(10 MG) BY MOUTH DAILY 90 tablet 0    betamethasone dipropionate (DIPROSONE) 0.05 % ointment Apply to stubborn eczema on hands 2 to 3 times weekly as needed 60 g 1    clotrimazole (LOTRIMIN) 1 % cream Apply topically 2 times daily 113 g 1    losartan (COZAAR) 100 MG tablet Take 1 tablet (100 mg) by mouth daily 90 tablet 3    Multiple Vitamin (MULTI-VITAMIN) per tablet Take 1 tablet by mouth daily.      olopatadine (PATADAY) 0.2 % ophthalmic solution Place 0.05 mLs (1 drop) into both eyes daily 2.5 mL 3    sildenafil (VIAGRA) 25 MG tablet Take 1 tablet (25 mg) by mouth daily as needed 30 tablet 11    triamcinolone (KENALOG) 0.1 % external ointment Apply topically 2 times daily 454 g 1     Allergies   Allergen Reactions    No Known Allergies      Current  "providers sharing in care for this patient include:  Patient Care Team:  Jose L Eason MD as PCP - General (Internal Medicine)  Yoly Obregon MD as MD (Dermatology)  Jose L Eason MD as Assigned PCP    The following health maintenance items are reviewed in Epic and correct as of today:  Health Maintenance   Topic Date Due    RSV VACCINE (Pregnancy & 60+) (1 - 1-dose 60+ series) Never done    LIPID  03/30/2024    ANNUAL REVIEW OF HM ORDERS  03/30/2024    MEDICARE ANNUAL WELLNESS VISIT  03/30/2024    ZOSTER IMMUNIZATION (2 of 2) 04/01/2024    COLORECTAL CANCER SCREENING  01/31/2025    FALL RISK ASSESSMENT  04/01/2025    GLUCOSE  03/30/2026    ADVANCE CARE PLANNING  03/30/2028    DTAP/TDAP/TD IMMUNIZATION (3 - Td or Tdap) 03/30/2033    HEPATITIS C SCREENING  Completed    PHQ-2 (once per calendar year)  Completed    INFLUENZA VACCINE  Completed    Pneumococcal Vaccine: 65+ Years  Completed    COVID-19 Vaccine  Completed    IPV IMMUNIZATION  Aged Out    HPV IMMUNIZATION  Aged Out    MENINGITIS IMMUNIZATION  Aged Out    RSV MONOCLONAL ANTIBODY  Aged Out            Objective    Exam  BP (!) 148/93   Pulse 68   Temp 97.2  F (36.2  C) (Tympanic)   Resp 16   Ht 1.753 m (5' 9\")   Wt 92.2 kg (203 lb 4.8 oz)   SpO2 100%   BMI 30.02 kg/m     Estimated body mass index is 30.02 kg/m  as calculated from the following:    Height as of this encounter: 1.753 m (5' 9\").    Weight as of this encounter: 92.2 kg (203 lb 4.8 oz).    Physical Exam  GENERAL: alert and no distress  EYES: Eyes grossly normal to inspection, PERRL and conjunctivae and sclerae normal  HENT: ear canals and TM's normal, nose and mouth without ulcers or lesions  NECK: no adenopathy, no asymmetry, masses, or scars  RESP: lungs clear to auscultation - no rales, rhonchi or wheezes  CV: regular rate and rhythm, normal S1 S2, no S3 or S4, no murmur, click or rub, no peripheral edema  ABDOMEN: soft, nontender, no hepatosplenomegaly, no masses and bowel " sounds normal  RECTAL: normal sphincter tone, no rectal masses, prostate normal size, smooth, nontender without nodules or masses  MS: no gross musculoskeletal defects noted, no edema  SKIN: no suspicious lesions or rashes  NEURO: Normal strength and tone, mentation intact and speech normal  PSYCH: mentation appears normal, affect normal/bright         4/1/2024   Mini Cog   Clock Draw Score 2 Normal    2 Normal   3 Item Recall 3 objects recalled    3 objects recalled   Mini Cog Total Score 5    5             Signed Electronically by: Jose L Eason MD

## 2024-04-02 RX ORDER — ATORVASTATIN CALCIUM 20 MG/1
20 TABLET, FILM COATED ORAL DAILY
Qty: 90 TABLET | Refills: 3 | Status: SHIPPED | OUTPATIENT
Start: 2024-04-02

## 2024-04-02 NOTE — RESULT ENCOUNTER NOTE
The following letter pertains to your most recent diagnostic tests:    -The cholesterol is higher than last check and too high for you.  I recommend increasing your atorvastatin (Lipitor) dose to a more substantial dose as your current dose is actually quite low.  I sent an prescription for a 20 mg tablet to your pharmacy.  You can use up your 10 mg tablets by taking two at once daily until you run out and then start taking the 20 mg tablets.      -Liver and gallbladder tests are normal for you. (ALT,AST, Alk phos, bilirubin), kidney function is normal for you (Creatinine, GFR), Sodium is normal, Potassium is normal for you, Calcium is normal for you, Glucose (blood sugar) is normal for you.      -Your prostate specific antigen (PSA) test result returned normal.     -Your complete blood counts including your hemoglobin returned normal for you.       -Your hemoglobin A1c test which averages your blood sugars over the last 3 months returned normal.  No evidence for diabetes or prediabetes.         Bottom line:  Labs look OK except for the cholesterol.    1.  Increase the atorvastatin (Lipitor) from 10 mg daily to 20 mg daily.  2.  Add the amlodipine (Norvasc) to the 100 mg of losartan that you are taking daily.        Follow up:  Schedule an appointment for a preventive examination in one year's time, or return sooner if new questions, symptoms or problems arise.        Sincerely,    Dr. Eason

## 2024-05-02 DIAGNOSIS — I10 ESSENTIAL HYPERTENSION: ICD-10-CM

## 2024-05-02 RX ORDER — LOSARTAN POTASSIUM 100 MG/1
100 TABLET ORAL DAILY
Qty: 90 TABLET | Refills: 3 | Status: SHIPPED | OUTPATIENT
Start: 2024-05-02

## 2024-07-10 ENCOUNTER — MYC MEDICAL ADVICE (OUTPATIENT)
Dept: FAMILY MEDICINE | Facility: CLINIC | Age: 69
End: 2024-07-10
Payer: COMMERCIAL

## 2024-07-11 ENCOUNTER — VIRTUAL VISIT (OUTPATIENT)
Dept: FAMILY MEDICINE | Facility: CLINIC | Age: 69
End: 2024-07-11
Payer: COMMERCIAL

## 2024-07-11 ENCOUNTER — NURSE TRIAGE (OUTPATIENT)
Dept: FAMILY MEDICINE | Facility: CLINIC | Age: 69
End: 2024-07-11

## 2024-07-11 DIAGNOSIS — U07.1 INFECTION DUE TO 2019 NOVEL CORONAVIRUS: Primary | ICD-10-CM

## 2024-07-11 PROCEDURE — 99213 OFFICE O/P EST LOW 20 MIN: CPT | Mod: 95 | Performed by: NURSE PRACTITIONER

## 2024-07-11 NOTE — PROGRESS NOTES
"Scott is a 68 year old who is being evaluated via a billable video visit.    How would you like to obtain your AVS? MyChart  If the video visit is dropped, the invitation should be resent by: Text to cell phone: [phone]  Will anyone else be joining your video visit? No      Assessment & Plan     Infection due to 2019 novel coronavirus  - nirmatrelvir and ritonavir (PAXLOVID) 300 mg/100 mg therapy pack; Take 3 tablets by mouth 2 times daily for 5 days (Take 2 Nirmatrelvir tablets and 1 Ritonavir tablet twice daily for 5 days)        COVID-19 positive patient.  Encounter for consideration of medication intervention. Patient does qualify for a prescription. Full discussion with patient including medication options, risks and benefits. Potential drug interactions reviewed with patient.     Treatment Planned Paxlovid, Rx sent     Temporary change to home medications: hold statin, viagra 8 days and half amlodipine for 8 days,     Estimated body mass index is 30.02 kg/m  as calculated from the following:    Height as of 4/1/24: 1.753 m (5' 9\").    Weight as of 4/1/24: 92.2 kg (203 lb 4.8 oz).  GFR Estimate   Date Value Ref Range Status   04/01/2024 74 >60 mL/min/1.73m2 Final   02/26/2020 77 >60 mL/min/[1.73_m2] Final     Comment:     Non  GFR Calc  Starting 12/18/2018, serum creatinine based estimated GFR (eGFR) will be   calculated using the Chronic Kidney Disease Epidemiology Collaboration   (CKD-EPI) equation.       No results found for: \"[id number]\"    Subjective   Scott is a 68 year old, presenting for the following health issues:  Covid Concern (Patient is having a virtual visit with Covid concerns.)    History of Present Illness       Reason for visit:  Covid Positive 7/10/2024  Symptom onset:  1-3 days ago  Symptoms include:  Aches, chills ,and fever.  Symptom intensity:  Moderate  Symptom progression:  Worsening  Had these symptoms before:  No  What makes it worse:  Talking  What makes it " better:  Eating cool foods.    He eats 4 or more servings of fruits and vegetables daily.He consumes 0 sweetened beverage(s) daily.He exercises with enough effort to increase his heart rate 60 or more minutes per day.  He exercises with enough effort to increase his heart rate 7 days per week.   He is taking medications regularly.         COVID-19 Symptom Review  How many days ago did these symptoms start? 7/11/2024. Symptoms started 2 days ago.     Are any of the following symptoms significant for you?  New or worsening difficulty breathing? No  Worsening cough? Yes, it's a dry cough.   Fever or chills? Yes, the highest temperature was 101.5  Headache: YES  Sore throat: YES  Chest pain: No  Diarrhea: No  Body aches? YES    What treatments has patient tried? Nonsteroidals   Does patient live in a nursing home, group home, or shelter? No  Does patient have a way to get food/medications during quarantined? Yes, I have a friend or family member who can help me.                Review of Systems  Detailed as above       Objective           Vitals:  No vitals were obtained today due to virtual visit.    Physical Exam   GENERAL: alert and no distress  EYES: Eyes grossly normal to inspection.  No discharge or erythema, or obvious scleral/conjunctival abnormalities.  RESP: No audible wheeze, cough, or visible cyanosis.    SKIN: Visible skin clear. No significant rash, abnormal pigmentation or lesions.  NEURO: Cranial nerves grossly intact.  Mentation and speech appropriate for age.  PSYCH: Appropriate affect, tone, and pace of words          Video-Visit Details    Type of service:  Video Visit   Originating Location (pt. Location): Home    Distant Location (provider location):  On-site  Platform used for Video Visit: Anjali  Signed Electronically by: JO Miles CNP

## 2024-07-11 NOTE — TELEPHONE ENCOUNTER
Pt called with COVID-19     Onset of symptoms: Tuesday     Date of positive test: Yesterday     Headache, chills, fatigue, aches, sore throat     Denies SOB or CP/pressure     RN COVID TREATMENT VISIT  07/11/24      The patient has been triaged and does not require a higher level of care.    Unable to complete Paxlovid protocol for patient as patient is taking Amlodipine, a medication that requires a provider visit.     Scheduled pt a VV today with team:     Appointments in Next Year      Jul 11, 2024 12:00 PM  (Arrive by 11:55 AM)  Provider Visit with JO Miles CNP  [facility] ([facility] ) [phone]     Apr 03, 2025 1:00 PM  (Arrive by 12:40 PM)  Preventative Adult Visit with Jose L Eason MD  [facility] ([facility] ) [phone]     Apr 09, 2025 11:00 AM  (Arrive by 10:40 AM)  Annual Wellness Visit with Jose L Eason MD  [facility] ([facility] ) [phone]          Key ARSHAD Triage RN  [facility]     Reason for Disposition   COVID-19 diagnosed by positive lab test (e.g., PCR, rapid self-test kit) and mild symptoms (e.g., cough, fever, others) and no complications or SOB    Additional Information   Negative: SEVERE difficulty breathing (e.g., struggling for each breath, speaks in single words)   Negative: Difficult to awaken or acting confused (e.g., disoriented, slurred speech)   Negative: Bluish (or gray) lips or face now   Negative: Shock suspected (e.g., cold/pale/clammy skin, too weak to stand, low BP, rapid pulse)   Negative: Sounds like a life-threatening emergency to the triager   Negative: Diagnosed or suspected COVID-19 and symptoms lasting 3 or more weeks   Negative: COVID-19 exposure and no symptoms   Negative: COVID-19 vaccine reaction suspected (e.g., fever, headache, muscle aches) occurring 1 to 3 days after  getting vaccine   Negative: COVID-19 vaccine, questions about   Negative: Lives with someone known to have influenza (flu test positive) and flu-like symptoms (e.g., cough, runny nose, sore throat, SOB; with or without fever)   Negative: Possible COVID-19 symptoms and triager concerned about severity of symptoms or other causes   Negative: COVID-19 and breastfeeding, questions about   Negative: SEVERE or constant chest pain or pressure  (Exception: Mild central chest pain, present only when coughing.)   Negative: MODERATE difficulty breathing (e.g., speaks in phrases, SOB even at rest, pulse 100-120)   Negative: Headache and stiff neck (can't touch chin to chest)   Negative: Oxygen level (e.g., pulse oximetry) 90% or lower   Negative: Chest pain or pressure  (Exception: MILD central chest pain, present only when coughing.)   Negative: Drinking very little and dehydration suspected (e.g., no urine > 12 hours, very dry mouth, very lightheaded)   Negative: Patient sounds very sick or weak to the triager   Negative: MILD difficulty breathing (e.g., minimal/no SOB at rest, SOB with walking, pulse <100)   Negative: Fever > 103 F (39.4 C)   Negative: Fever > 101 F (38.3 C) and over 60 years of age   Negative: Fever > 100.0 F (37.8 C) and bedridden (e.g., CVA, chronic illness, recovering from surgery)   Negative: HIGH RISK patient (e.g., weak immune system, age > 64 years, obesity with BMI of 30 or higher, pregnant, chronic lung disease or other chronic medical condition) and COVID symptoms (e.g., cough, fever)  (Exceptions: Already seen by doctor or NP/PA and no new or worsening symptoms.)   Negative: HIGH RISK patient and influenza is widespread in the community and ONE OR MORE respiratory symptoms: cough, sore throat, runny or stuffy nose   Negative: HIGH RISK patient and influenza exposure within the last 7 days and ONE OR MORE respiratory symptoms: cough, sore throat, runny or stuffy nose   Negative: Oxygen level  (e.g., pulse oximetry) 91 to 94%   Negative: COVID-19 infection suspected by caller or triager and mild symptoms (cough, fever, or others) and negative COVID-19 rapid test   Negative: Fever present > 3 days (72 hours)   Negative: Fever returns after gone for over 24 hours and symptoms worse or not improved   Negative: Continuous (nonstop) coughing interferes with work or school and no improvement using cough treatment per Care Advice   Negative: Cough present > 3 weeks   Negative: COVID-19 diagnosed by positive lab test (e.g., PCR, rapid self-test kit) and NO symptoms (e.g., cough, fever, others)    Protocols used: Coronavirus (COVID-19) Diagnosed or [id number]

## 2024-12-03 ENCOUNTER — IMMUNIZATION (OUTPATIENT)
Dept: INTERNAL MEDICINE | Facility: CLINIC | Age: 69
End: 2024-12-03
Payer: COMMERCIAL

## 2024-12-03 PROCEDURE — 91320 SARSCV2 VAC 30MCG TRS-SUC IM: CPT

## 2024-12-03 PROCEDURE — 90480 ADMN SARSCOV2 VAC 1/ONLY CMP: CPT

## 2024-12-03 PROCEDURE — 90662 IIV NO PRSV INCREASED AG IM: CPT

## 2024-12-03 PROCEDURE — 90471 IMMUNIZATION ADMIN: CPT

## 2025-01-01 ENCOUNTER — PATIENT OUTREACH (OUTPATIENT)
Dept: CARE COORDINATION | Facility: CLINIC | Age: 70
End: 2025-01-01
Payer: COMMERCIAL

## 2025-02-26 ENCOUNTER — MYC MEDICAL ADVICE (OUTPATIENT)
Dept: FAMILY MEDICINE | Facility: CLINIC | Age: 70
End: 2025-02-26
Payer: COMMERCIAL

## 2025-02-26 DIAGNOSIS — Z12.11 SCREEN FOR COLON CANCER: Primary | ICD-10-CM

## 2025-03-03 ENCOUNTER — TELEPHONE (OUTPATIENT)
Dept: GASTROENTEROLOGY | Facility: CLINIC | Age: 70
End: 2025-03-03
Payer: COMMERCIAL

## 2025-03-03 NOTE — TELEPHONE ENCOUNTER
"Endoscopy Scheduling Screen    Have you had any respiratory illness or flu-like symptoms in the last 10 days?  No    What is your communication preference for Instructions and/or Bowel Prep?   MyChart    What insurance is in the chart?  Other:  medica    Ordering/Referring Provider: Jose L Eason MD     (If ordering provider performs procedure, schedule with ordering provider unless otherwise instructed. )    BMI: Estimated body mass index is 30.02 kg/m  as calculated from the following:    Height as of 4/1/24: 1.753 m (5' 9\").    Weight as of 4/1/24: 92.2 kg (203 lb 4.8 oz).     Sedation Ordered  moderate sedation.   If patient BMI > 50 do not schedule in [facility].    If patient BMI > 45 do not schedule at [facility].    Are you taking methadone or Suboxone?  NO, No RN review required.    Have you been diagnosed and are being treated for severe PTSD or severe anxiety?  NO, No RN review required.    Are you taking any prescription medications for pain 3 or more times per week?   NO, No RN review required.    Do you have a history of malignant hyperthermia?  No    (Females) Are you currently pregnant?   No     Have you been diagnosed or told you have pulmonary hypertension?   No    Do you have an LVAD?  No    Have you been told you have moderate to severe sleep apnea?  No.    Have you been told you have COPD, asthma, or any other lung disease?  No    Has your doctor ordered any cardiac tests like echo, angiogram, stress test, ablation, or EKG, that you have not completed yet?  No    Do you  have a history of any heart conditions?  No     Have you ever had or are you waiting for an organ transplant?  No. Continue scheduling, no site restrictions.    Have you had a stroke or transient ischemic attack (TIA aka \"mini stroke\") in the last 2 years?   No.    Have you been diagnosed with or been told you have cirrhosis of the liver?   No.    Are you currently on dialysis?   No    Do you need assistance transferring?   No    BMI: " "Estimated body mass index is 30.02 kg/m  as calculated from the following:    Height as of 4/1/24: 1.753 m (5' 9\").    Weight as of 4/1/24: 92.2 kg (203 lb 4.8 oz).     Is patients BMI > 40 and scheduling location [facility]?  No    Do you take an injectable or oral medication for weight loss or diabetes (excluding insulin)?  No    Do you take the medication Naltrexone?  No    Do you take blood thinners?  No       Prep   Are you currently on dialysis or do you have chronic kidney disease?  No    Do you have a diagnosis of diabetes?  No    Do you have a diagnosis of cystic fibrosis (CF)?  No    On a regular basis do you go 3 -5 days between bowel movements?  No    BMI > 40?  No    Preferred Pharmacy:    [id number] - [place], [place] - [address] [place] [facility] [place] [id number]  Phone: [phone] Fax: [phone]        Final Scheduling Details     Procedure scheduled  Colonoscopy    Surgeon:  rufino    Date of procedure:  3/12/25     Pre-OP / PAC:   No - Not required for this site.    Location  [facility] - Patient preference.    Sedation   Moderate Sedation - Per order.      Patient Reminders:   You will receive a call from a Nurse to review instructions and health history.  This assessment must be completed prior to your procedure.  Failure to complete the Nurse assessment may result in the procedure being cancelled.      On the day of your procedure, please designate an adult(s) who can drive you home stay with you for the next 24 hours. The medicines used in the exam will make you sleepy. You will not be able to drive.      You cannot take public transportation, ride share services, or non-medical taxi service without a responsible caregiver.  Medical transport services are allowed with the requirement that a responsible caregiver will receive you at your destination.  We require that drivers and caregivers are confirmed prior to your procedure.  "

## 2025-03-04 ENCOUNTER — TELEPHONE (OUTPATIENT)
Dept: GASTROENTEROLOGY | Facility: CLINIC | Age: 70
End: 2025-03-04

## 2025-03-04 ENCOUNTER — VIRTUAL VISIT (OUTPATIENT)
Dept: FAMILY MEDICINE | Facility: CLINIC | Age: 70
End: 2025-03-04
Payer: COMMERCIAL

## 2025-03-04 DIAGNOSIS — M79.2 NEUROPATHIC PAIN: Primary | ICD-10-CM

## 2025-03-04 PROCEDURE — 98005 SYNCH AUDIO-VIDEO EST LOW 20: CPT | Performed by: INTERNAL MEDICINE

## 2025-03-04 NOTE — TELEPHONE ENCOUNTER
Pre visit planning completed.      Procedure details:    Patient scheduled for Colonoscopy on 3/12/25.     Arrival time: 0915. Procedure time 1000    Facility location: [facility]; [facility] [place] [address]. Check in location: 1st Floor [facility].     Sedation type: Conscious sedation     Pre op exam needed? No.    Indication for procedure: screening      Chart review:     Electronic implanted devices? No    Recent diagnosis of diverticulitis within the last 6 weeks? No      Medication review:    Diabetic? No    Anticoagulants? No    Weight loss medication/injectable? No GLP-1 medication per patient's medication list. Nursing to verify with pre-assessment call.    Other medication HOLDING recommendations:  N/A      Prep for procedure:     Bowel prep recommendation: Standard Miralax.   Due to: standard bowel prep    Procedure information and instructions sent via [employer]         Paige Zapata RN  Endoscopy Procedure Pre Assessment   [phone] option 3

## 2025-03-04 NOTE — TELEPHONE ENCOUNTER
Pre assessment completed for upcoming procedure.   (Please see previous telephone encounter notes for complete details)    Patient returned call.       Procedure details:    Arrival time and facility location reviewed.    Pre op exam needed? No.    Designated  policy reviewed. Instructed to have someone stay 6  hours post procedure.       Medication review:    Medications reviewed. Please see supporting documentation below. Holding recommendations discussed (if applicable).   N/A      Prep for procedure:     Procedure prep instructions reviewed.        Any additional information needed:  N/A      Patient verbalized understanding and had no questions or concerns at this time.      Nasima Xavier RN  Endoscopy Procedure Pre Assessment   [phone] option 3

## 2025-03-04 NOTE — PROGRESS NOTES
Scott is a 69 year old who is being evaluated via a billable video visit.    How would you like to obtain your AVS? MyChart  If the video visit is dropped, the invitation should be resent by: Text to cell phone: [phone]  Will anyone else be joining your video visit? No      Assessment & Plan     Neuropathic pain  Erb's palsy      Continue current low-dose gabapentin which seems to be helping with chronic neuropathic pain and does not seem to be causing any intolerable side effects.  We discussed that the dose could be increased if needed as symptoms dictate.  He will reach out to us if that is the case.  We also discussed the referral to neurology or physical therapy but as his symptoms seem to be well-controlled currently, he does not wish to pursue those referrals at this point.  He has his colonoscopy scheduled for later this month and is scheduled to see me for a preventative visit in June        FUTURE APPOINTMENTS:       - Follow-up for annual visit or as needed    Subjective   Scott is a 69 year old, presenting for the following health issues:  Recheck Medication         No data to display              History of Present Illness       Reason for visit:  Erb's Palsy / Gabapentin follow up    He eats 2-3 servings of fruits and vegetables daily.He consumes 0 sweetened beverage(s) daily.He exercises with enough effort to increase his heart rate 20 to 29 minutes per day.  He exercises with enough effort to increase his heart rate 4 days per week.   He is taking medications regularly.              Scott scheduled this video visit to follow-up on gabapentin that he has been using for chronic neuropathic pain related to his history of Erbs palsy  Medication does seem to be taking the edge off his pain and currently he is not having any pain  He is not having any side effects from the medication      Objective    Vitals - Patient Reported  Weight (Patient Reported): 92.1 kg (203 lb)  Height (Patient  "Reported): 175.3 cm (5' 9\")  BMI (Based on Pt Reported Ht/Wt): 29.98        Physical Exam   GENERAL: alert and no distress  EYES: Eyes grossly normal to inspection.  No discharge or erythema, or obvious scleral/conjunctival abnormalities.  RESP: No audible wheeze, cough, or visible cyanosis.    SKIN: Visible skin clear. No significant rash, abnormal pigmentation or lesions.  NEURO: Cranial nerves grossly intact.  Mentation and speech appropriate for age.  PSYCH: Appropriate affect, tone, and pace of words          Video-Visit Details    Type of service:  Video Visit   Originating Location (pt. Location): Home    Distant Location (provider location):  On-site  Platform used for Video Visit: Anjali  Signed Electronically by: Jose L Eason MD    "

## 2025-03-04 NOTE — TELEPHONE ENCOUNTER
Attempted to contact patient in order to complete pre assessment questions.     No answer. Left message to return call to [phone] option 3    Callback communication sent via [employer].      Greer Gracia RN  Endoscopy Procedure Pre-Assessment RN  [phone], option 3

## 2025-03-11 RX ORDER — LIDOCAINE 40 MG/G
CREAM TOPICAL
Status: CANCELLED | OUTPATIENT
Start: 2025-03-11

## 2025-03-11 RX ORDER — ONDANSETRON 2 MG/ML
4 INJECTION INTRAMUSCULAR; INTRAVENOUS
Status: CANCELLED | OUTPATIENT
Start: 2025-03-11

## 2025-03-12 ENCOUNTER — HOSPITAL ENCOUNTER (OUTPATIENT)
Facility: CLINIC | Age: 70
Discharge: HOME | End: 2025-03-12
Attending: COLON & RECTAL SURGERY | Admitting: COLON & RECTAL SURGERY
Payer: COMMERCIAL

## 2025-03-12 VITALS
DIASTOLIC BLOOD PRESSURE: 87 MMHG | SYSTOLIC BLOOD PRESSURE: 122 MMHG | RESPIRATION RATE: 7 BRPM | WEIGHT: 198 LBS | BODY MASS INDEX: 29.33 KG/M2 | HEIGHT: 69 IN | HEART RATE: 67 BPM | OXYGEN SATURATION: 96 %

## 2025-03-12 LAB — COLONOSCOPY: NORMAL

## 2025-03-12 PROCEDURE — 250N000011 HC RX IP 250 OP 636: Performed by: COLON & RECTAL SURGERY

## 2025-03-12 PROCEDURE — 45385 COLONOSCOPY W/LESION REMOVAL: CPT | Mod: PT | Performed by: COLON & RECTAL SURGERY

## 2025-03-12 PROCEDURE — 88305 TISSUE EXAM BY PATHOLOGIST: CPT | Mod: TC | Performed by: COLON & RECTAL SURGERY

## 2025-03-12 PROCEDURE — 88305 TISSUE EXAM BY PATHOLOGIST: CPT | Mod: 26 | Performed by: PATHOLOGY

## 2025-03-12 PROCEDURE — G0500 MOD SEDAT ENDO SERVICE >5YRS: HCPCS | Performed by: COLON & RECTAL SURGERY

## 2025-03-12 RX ORDER — FENTANYL CITRATE 50 UG/ML
INJECTION, SOLUTION INTRAMUSCULAR; INTRAVENOUS PRN
Status: DISCONTINUED | OUTPATIENT
Start: 2025-03-12 | End: 2025-03-12 | Stop reason: HOSPADM

## 2025-03-12 ASSESSMENT — ACTIVITIES OF DAILY LIVING (ADL): ADLS_ACUITY_SCORE: 41

## 2025-03-12 NOTE — H&P
Colon & Rectal Surgery History and Physical  Pre-Endoscopy Procedure Note    History of Present Illness   I have been asked by Dr. Eason to evaluate this 69 year old male for colorectal polyp surveillance. He currently denies any abdominal pain, weight loss, bleeding per rectum, or recent change in bowel habits.    Past Medical History  Diagnosis Date    HTN (hypertension) 09/21/2012    Hyperlipidemia LDL 09/22/2012    Intrinsic eczema 12/20/2017       Past Surgical History  Procedure Laterality Date    ENT SURGERY      Tonsillectomy        Medications  Medications Prior to Admission   Medication Sig    amLODIPine (NORVASC) 5 MG tablet Take 1 tablet (5 mg) by mouth daily    atorvastatin (LIPITOR) 20 MG tablet Take 1 tablet (20 mg) by mouth daily    betamethasone dipropionate (DIPROSONE) 0.05 % ointment Apply to stubborn eczema on hands 2 to 3 times weekly as needed    clotrimazole (LOTRIMIN) 1 % cream Apply topically 2 times daily    gabapentin (NEURONTIN) 100 MG capsule One capsule in AM, one capsule around NOON and 2 capsules at bedtime.    losartan (COZAAR) 100 MG tablet TAKE 1 TABLET(100 MG) BY MOUTH DAILY    Multiple Vitamin (MULTI-VITAMIN) per tablet Take 1 tablet by mouth daily.    olopatadine (PATADAY) 0.2 % ophthalmic solution Place 0.05 mLs (1 drop) into both eyes daily    triamcinolone (KENALOG) 0.1 % external ointment Apply topically 2 times daily    sildenafil (VIAGRA) 25 MG tablet Take 1 tablet (25 mg) by mouth daily as needed       Allergies  Allergen Reactions    No Known Allergies         Family History   Family history includes Allergies in his father; Diabetes in his sister; Parkinsonism in his mother.     Social History   He reports that he has never smoked. He has never used smokeless tobacco. He reports current alcohol use. He reports that he does not use drugs.    Review of Systems   Constitutional:  No fever, weight change or fatigue.    Eyes:     No dry eyes or vision  "changes.   Ears/Nose/Throat/Neck:  No oral ulcers, sore throat or voice change.    Cardiovascular:   No palpitations, syncope, angina or edema.   Respiratory:    No chest pain, excessive sleepiness, shortness of breath or hemoptysis.    Gastrointestinal:   No abdominal pain, nausea, vomiting, diarrhea or heartburn.    Genitourinary:   No dysuria, hematuria, urinary retention or urinary frequency.   Musculoskeletal:  No joint swelling or arthralgias.    Dermatologic:  No skin rash or other skin changes.   Neurologic:    No focal weakness or numbness. No neuropathy.   Psychiatric:    No depression, anxiety, suicidal ideation, or paranoid ideation.   Endocrine:   No cold or heat intolerance, polydipsia, hirsutism, change in libido, or flushing.   Hematology/Lymphatic:  No bleeding or lymphadenopathy.    Allergy/Immunology:  No rhinitis or hives.     Physical Exam   Vitals:  Blood pressure 106/81, pulse 81, resp. rate 16, height 1.753 m (5' 9\"), weight 89.8 kg (198 lb), SpO2 95%.    General:  Alert and oriented to person, place and time   Airway: Normal oropharyngeal airway and neck mobility   Lungs:  Clear bilaterally   Heart:  Regular rate and rhythm   Abdomen: Soft, NT, ND, no masses   Extremities: Warm, good capillary refill    ASA Grade: II (mild systemic disease)    Impression: Cleared for use of conscious sedation for colorectal polyp surveillance    Plan: Proceed with colonoscopy     Koki Ansari MD  [facility]  [phone]  "

## 2025-03-13 LAB
PATH REPORT.COMMENTS IMP SPEC: NORMAL
PATH REPORT.COMMENTS IMP SPEC: NORMAL
PATH REPORT.FINAL DX SPEC: NORMAL
PATH REPORT.GROSS SPEC: NORMAL
PATH REPORT.MICROSCOPIC SPEC OTHER STN: NORMAL
PATH REPORT.RELEVANT HX SPEC: NORMAL
PHOTO IMAGE: NORMAL

## 2025-03-26 PROBLEM — D12.6 ADENOMATOUS POLYP OF COLON: Status: ACTIVE | Noted: 2025-03-26

## 2025-03-27 ENCOUNTER — PATIENT OUTREACH (OUTPATIENT)
Dept: GASTROENTEROLOGY | Facility: CLINIC | Age: 70
End: 2025-03-27
Payer: COMMERCIAL

## 2025-03-30 DIAGNOSIS — I10 PRIMARY HYPERTENSION: ICD-10-CM

## 2025-03-31 RX ORDER — AMLODIPINE BESYLATE 5 MG/1
5 TABLET ORAL DAILY
Qty: 90 TABLET | Refills: 2 | Status: SHIPPED | OUTPATIENT
Start: 2025-03-31

## 2025-04-04 SDOH — HEALTH STABILITY: PHYSICAL HEALTH: ON AVERAGE, HOW MANY MINUTES DO YOU ENGAGE IN EXERCISE AT THIS LEVEL?: 30 MIN

## 2025-04-04 SDOH — HEALTH STABILITY: PHYSICAL HEALTH: ON AVERAGE, HOW MANY DAYS PER WEEK DO YOU ENGAGE IN MODERATE TO STRENUOUS EXERCISE (LIKE A BRISK WALK)?: 5 DAYS

## 2025-04-04 ASSESSMENT — SOCIAL DETERMINANTS OF HEALTH (SDOH): HOW OFTEN DO YOU GET TOGETHER WITH FRIENDS OR RELATIVES?: TWICE A WEEK

## 2025-04-09 ENCOUNTER — OFFICE VISIT (OUTPATIENT)
Dept: FAMILY MEDICINE | Facility: CLINIC | Age: 70
End: 2025-04-09
Payer: COMMERCIAL

## 2025-04-09 VITALS
RESPIRATION RATE: 20 BRPM | TEMPERATURE: 97.7 F | BODY MASS INDEX: 29.03 KG/M2 | WEIGHT: 196 LBS | OXYGEN SATURATION: 99 % | HEART RATE: 61 BPM | SYSTOLIC BLOOD PRESSURE: 125 MMHG | DIASTOLIC BLOOD PRESSURE: 78 MMHG | HEIGHT: 69 IN

## 2025-04-09 DIAGNOSIS — Z13.1 SCREENING FOR DIABETES MELLITUS: ICD-10-CM

## 2025-04-09 DIAGNOSIS — I10 BENIGN ESSENTIAL HYPERTENSION: ICD-10-CM

## 2025-04-09 DIAGNOSIS — N52.9 ERECTILE DYSFUNCTION, UNSPECIFIED ERECTILE DYSFUNCTION TYPE: ICD-10-CM

## 2025-04-09 DIAGNOSIS — M79.2 NEUROPATHIC PAIN: ICD-10-CM

## 2025-04-09 DIAGNOSIS — I71.40 ABDOMINAL AORTIC ANEURYSM (AAA) WITHOUT RUPTURE, UNSPECIFIED PART: ICD-10-CM

## 2025-04-09 DIAGNOSIS — Z12.5 SCREENING FOR PROSTATE CANCER: ICD-10-CM

## 2025-04-09 DIAGNOSIS — E78.5 HYPERLIPIDEMIA LDL GOAL <100: ICD-10-CM

## 2025-04-09 DIAGNOSIS — Z00.00 ROUTINE GENERAL MEDICAL EXAMINATION AT A HEALTH CARE FACILITY: Primary | ICD-10-CM

## 2025-04-09 DIAGNOSIS — R10.9 LEFT FLANK PAIN: ICD-10-CM

## 2025-04-09 LAB
ALBUMIN SERPL BCG-MCNC: 4.5 G/DL (ref 3.5–5.2)
ALP SERPL-CCNC: 62 U/L (ref 40–150)
ALT SERPL W P-5'-P-CCNC: 32 U/L (ref 0–70)
ANION GAP SERPL CALCULATED.3IONS-SCNC: 13 MMOL/L (ref 7–15)
AST SERPL W P-5'-P-CCNC: 28 U/L (ref 0–45)
BILIRUB SERPL-MCNC: 0.7 MG/DL
BUN SERPL-MCNC: 17.6 MG/DL (ref 8–23)
CALCIUM SERPL-MCNC: 9.3 MG/DL (ref 8.8–10.4)
CHLORIDE SERPL-SCNC: 101 MMOL/L (ref 98–107)
CHOLEST SERPL-MCNC: 185 MG/DL
CREAT SERPL-MCNC: 1.02 MG/DL (ref 0.67–1.17)
EGFRCR SERPLBLD CKD-EPI 2021: 81 ML/MIN/1.73M2
ERYTHROCYTE [DISTWIDTH] IN BLOOD BY AUTOMATED COUNT: 13.1 % (ref 10–15)
EST. AVERAGE GLUCOSE BLD GHB EST-MCNC: 108 MG/DL
FASTING STATUS PATIENT QL REPORTED: YES
FASTING STATUS PATIENT QL REPORTED: YES
GLUCOSE SERPL-MCNC: 96 MG/DL (ref 70–99)
HBA1C MFR BLD: 5.4 % (ref 0–5.6)
HCO3 SERPL-SCNC: 25 MMOL/L (ref 22–29)
HCT VFR BLD AUTO: 48 % (ref 40–53)
HDLC SERPL-MCNC: 61 MG/DL
HGB BLD-MCNC: 15.7 G/DL (ref 13.3–17.7)
LDLC SERPL CALC-MCNC: 99 MG/DL
MCH RBC QN AUTO: 30.8 PG (ref 26.5–33)
MCHC RBC AUTO-ENTMCNC: 32.7 G/DL (ref 31.5–36.5)
MCV RBC AUTO: 94 FL (ref 78–100)
NONHDLC SERPL-MCNC: 124 MG/DL
PLATELET # BLD AUTO: 201 10E3/UL (ref 150–450)
POTASSIUM SERPL-SCNC: 3.9 MMOL/L (ref 3.4–5.3)
PROT SERPL-MCNC: 7.1 G/DL (ref 6.4–8.3)
PSA SERPL DL<=0.01 NG/ML-MCNC: 0.28 NG/ML (ref 0–4.5)
RBC # BLD AUTO: 5.09 10E6/UL (ref 4.4–5.9)
SODIUM SERPL-SCNC: 139 MMOL/L (ref 135–145)
TRIGL SERPL-MCNC: 124 MG/DL
WBC # BLD AUTO: 6.3 10E3/UL (ref 4–11)

## 2025-04-09 PROCEDURE — 83036 HEMOGLOBIN GLYCOSYLATED A1C: CPT | Performed by: INTERNAL MEDICINE

## 2025-04-09 PROCEDURE — 36415 COLL VENOUS BLD VENIPUNCTURE: CPT | Performed by: INTERNAL MEDICINE

## 2025-04-09 PROCEDURE — G0103 PSA SCREENING: HCPCS | Performed by: INTERNAL MEDICINE

## 2025-04-09 PROCEDURE — 80053 COMPREHEN METABOLIC PANEL: CPT | Performed by: INTERNAL MEDICINE

## 2025-04-09 PROCEDURE — 85027 COMPLETE CBC AUTOMATED: CPT | Performed by: INTERNAL MEDICINE

## 2025-04-09 PROCEDURE — 80061 LIPID PANEL: CPT | Performed by: INTERNAL MEDICINE

## 2025-04-09 RX ORDER — SILDENAFIL 100 MG/1
100 TABLET, FILM COATED ORAL DAILY PRN
Qty: 30 TABLET | Refills: 11 | Status: SHIPPED | OUTPATIENT
Start: 2025-04-09

## 2025-04-09 ASSESSMENT — PAIN SCALES - GENERAL: PAINLEVEL_OUTOF10: NO PAIN (0)

## 2025-04-09 NOTE — PROGRESS NOTES
"Preventive Care Visit  [facility]  Jose L Eason MD, Internal Medicine  Apr 9, 2025      Assessment & Plan     Routine general medical examination at a health care facility      Abdominal aortic aneurysm (AAA) without rupture, unspecified part  Plan for follow up imaging in spring of 2026 (3 year interval)    Benign essential hypertension  Well controlled on current losartan and amlodipine; continue current drugs, recheck labs     Hyperlipidemia LDL goal <100  On statin therapy ; recheck lipid  - Lipid panel reflex to direct LDL Non-fasting; Future  - Comprehensive metabolic panel; Future  - CBC with platelets; Future    Erb's palsy Neuropathic pain  Takes gabapentin PRN for pain related to this problem     Screening for prostate cancer    - PROSTATE SPEC ANTIGEN SCREEN; Future    Screening for diabetes mellitus    - HEMOGLOBIN A1C; Future    Left flank pain  Describes one year of intermittent left flank pain  He is concerned about a kidney stone, check non-contrast CT to investigate   - CT Abdomen Pelvis w/o Contrast; Future  - OFFICE/OUTPT VISIT,EST,LEVL IV    Patient has been advised of split billing requirements and indicates understanding: Yes        BMI  Estimated body mass index is 29.37 kg/m  as calculated from the following:    Height as of this encounter: 1.74 m (5' 8.5\").    Weight as of this encounter: 88.9 kg (196 lb).   Weight management plan: Discussed healthy diet and exercise guidelines    Counseling  Appropriate preventive services were addressed with this patient via screening, questionnaire, or discussion as appropriate for fall prevention, nutrition, physical activity, Tobacco-use cessation, social engagement, weight loss and cognition.  Checklist reviewing preventive services available has been given to the patient.  Reviewed patient's diet, addressing concerns and/or questions.       FUTURE APPOINTMENTS:       - Follow-up for annual visit or as needed    Subjective "   Scott is a 69 year old, presenting for the following:  Physical (Patient is here for annual wellness visit.)        4/9/2025    10:55 AM   Additional Questions   Roomed by Rasheed PEREZ MA   Accompanied by Self          HPI           Advance Care Planning  Patient does not have a Health Care Directive:       4/4/2025   General Health   How would you rate your overall physical health? Good   Feel stress (tense, anxious, or unable to sleep) Only a little   (!) STRESS CONCERN      4/4/2025   Nutrition   Diet: Low salt    Low fat/cholesterol       Multiple values from one day are sorted in reverse-chronological order         4/4/2025   Exercise   Days per week of moderate/strenous exercise 5 days   Average minutes spent exercising at this level 30 min         4/4/2025   Social Factors   Frequency of gathering with friends or relatives Twice a week   Worry food won't last until get money to buy more No   Food not last or not have enough money for food? No   Do you have housing? (Housing is defined as stable permanent housing and does not include staying ouside in a car, in a tent, in an abandoned building, in an overnight shelter, or couch-surfing.) Yes   Are you worried about losing your housing? No   Lack of transportation? No   Unable to get utilities (heat,electricity)? No         4/4/2025   Fall Risk   Fallen 2 or more times in the past year? No    No   Trouble with walking or balance? No    No       Multiple values from one day are sorted in reverse-chronological order          4/4/2025   Activities of Daily Living- Home Safety   Needs help with the following daily activites None of the above   Safety concerns in the home None of the above         4/4/2025   Dental   Dentist two times every year? Yes         4/4/2025   Hearing Screening   Hearing concerns? None of the above         4/4/2025   Driving Risk Screening   Patient/family members have concerns about driving No         4/4/2025   General Alertness/Fatigue  Screening   Have you been more tired than usual lately? No         4/4/2025   Urinary Incontinence Screening   Bothered by leaking urine in past 6 months No           4/1/2024   TB Screening   Were you born outside of the [place]? No           Today's PHQ-2 Score:       4/9/2025    10:39 AM   PHQ-2 ( 1999 [employer])   Q1: Little interest or pleasure in doing things 0   Q2: Feeling down, depressed or hopeless 0   PHQ-2 Score 0    Q1: Little interest or pleasure in doing things Not at all   Q2: Feeling down, depressed or hopeless Not at all   PHQ-2 Score 0       Patient-reported           4/4/2025   Substance Use   Alcohol more than 3/day or more than 7/wk No   Do you have a current opioid prescription? No   How severe/bad is pain from 1 to 10? 0/10 (No Pain)   Do you use any other substances recreationally? No     Social History     Tobacco Use    Smoking status: Never    Smokeless tobacco: Never   Vaping Use    Vaping status: Never Used   Substance Use Topics    Alcohol use: Yes     Comment: 4-5 drinks per week    Drug use: No           4/4/2025   AAA Screening   Family history of Abdominal Aortic Aneurysm (AAA)? No   Last PSA:   PSA   Date Value Ref Range Status   02/26/2020 0.85 0 - 4 ug/L Final     Comment:     Assay Method:  Chemiluminescence using Siemens Vista analyzer     Prostate Specific Antigen Screen   Date Value Ref Range Status   04/01/2024 0.37 0.00 - 4.50 ng/mL Final   07/12/2021 0.57 0.00 - 4.00 ug/L Final     ASCVD Risk   The 10-year ASCVD risk score (Deejay CLINTON, et al., 2019) is: 15.4%    Values used to calculate the score:      Age: 69 years      Sex: Male      Is Non- : No      Diabetic: No      Tobacco smoker: No      Systolic Blood Pressure: 125 mmHg      Is BP treated: Yes      HDL Cholesterol: 58 mg/dL      Total Cholesterol: 205 mg/dL            Reviewed and updated as needed this visit by Provider                    Past Medical History:   Diagnosis Date    HTN  (hypertension) 2012    Hyperlipidemia LDL goal <130 2012    Intrinsic eczema 2017     Past Surgical History:   Procedure Laterality Date    COLONOSCOPY      COLONOSCOPY N/A 2020    Procedure: COLONOSCOPY, WITH POLYPECTOMY AND BIOPSY;  Surgeon: Rasta Thompson MD;  Location:  GI    COLONOSCOPY N/A 3/12/2025    Procedure: Colonoscopy;  Surgeon: Koki Ansari MD;  Location:  GI    ENT SURGERY      Tonsillectomy     BP Readings from Last 3 Encounters:   25 125/78   25 122/87   24 (!) 148/93    Wt Readings from Last 3 Encounters:   25 88.9 kg (196 lb)   25 89.8 kg (198 lb)   24 92.2 kg (203 lb 4.8 oz)                  Patient Active Problem List   Diagnosis    Benign essential hypertension    Hyperlipidemia LDL goal <100    Erb's palsy    Neck pain    Intrinsic eczema    Abdominal aortic aneurysm (AAA) without rupture, unspecified part    Erectile dysfunction, unspecified erectile dysfunction type    Adenomatous polyp of colon    Neuropathic pain     Past Surgical History:   Procedure Laterality Date    COLONOSCOPY      COLONOSCOPY N/A 2020    Procedure: COLONOSCOPY, WITH POLYPECTOMY AND BIOPSY;  Surgeon: Rasta Thompson MD;  Location:  GI    COLONOSCOPY N/A 3/12/2025    Procedure: Colonoscopy;  Surgeon: Koki Ansari MD;  Location:  GI    ENT SURGERY      Tonsillectomy       Social History     Tobacco Use    Smoking status: Never    Smokeless tobacco: Never   Substance Use Topics    Alcohol use: Yes     Comment: 4-5 drinks per week     Family History   Problem Relation Age of Onset    Allergies Father          in 30s    Parkinsonism Mother     Diabetes Sister     Cancer - colorectal No family hx of     Prostate Cancer No family hx of     Skin Cancer No family hx of         No known family hx of skin cancer    Melanoma No family hx of          Current Outpatient Medications   Medication Sig Dispense Refill     amLODIPine (NORVASC) 5 MG tablet Take 1 tablet (5 mg) by mouth daily. 90 tablet 2    atorvastatin (LIPITOR) 20 MG tablet Take 1 tablet (20 mg) by mouth daily 90 tablet 3    betamethasone dipropionate (DIPROSONE) 0.05 % ointment Apply to stubborn eczema on hands 2 to 3 times weekly as needed 60 g 1    clotrimazole (LOTRIMIN) 1 % cream Apply topically 2 times daily 113 g 1    gabapentin (NEURONTIN) 100 MG capsule One capsule in AM, one capsule around NOON and 2 capsules at bedtime. 450 capsule 3    losartan (COZAAR) 100 MG tablet TAKE 1 TABLET(100 MG) BY MOUTH DAILY 90 tablet 3    Multiple Vitamin (MULTI-VITAMIN) per tablet Take 1 tablet by mouth daily.      olopatadine (PATADAY) 0.2 % ophthalmic solution Place 0.05 mLs (1 drop) into both eyes daily 2.5 mL 3    sildenafil (VIAGRA) 25 MG tablet Take 1 tablet (25 mg) by mouth daily as needed 30 tablet 11    triamcinolone (KENALOG) 0.1 % external ointment Apply topically 2 times daily 454 g 1     Current providers sharing in care for this patient include:  Patient Care Team:  Jose L Eason MD as PCP - General (Internal Medicine)  Yoly Huston MD as MD (Dermatology)  Jose L Eason MD as Assigned PCP    The following health maintenance items are reviewed in Epic and correct as of today:  Health Maintenance   Topic Date Due    BMP  04/01/2025    LIPID  04/01/2025    MEDICARE ANNUAL WELLNESS VISIT  04/01/2025    COVID-19 Vaccine (7 - 2024-25 season) 06/03/2025    ANNUAL REVIEW OF HM ORDERS  03/04/2026    FALL RISK ASSESSMENT  04/09/2026    DIABETES SCREENING  04/01/2027    ADVANCE CARE PLANNING  04/01/2029    COLORECTAL CANCER SCREENING  03/12/2030    RSV VACCINE (1 - 1-dose 75+ series) 07/15/2032    DTAP/TDAP/TD IMMUNIZATION (3 - Td or Tdap) 03/30/2033    HEPATITIS C SCREENING  Completed    PHQ-2 (once per calendar year)  Completed    INFLUENZA VACCINE  Completed    Pneumococcal Vaccine: 50+ Years  Completed    ZOSTER IMMUNIZATION  Completed    HPV  "IMMUNIZATION  Aged Out    MENINGITIS IMMUNIZATION  Aged Out       A 10 organ systems ROS is negative other than any pertinent positives or negatives previously stated.      Objective    Exam  /78 (BP Location: Right arm, Patient Position: Sitting, Cuff Size: Adult Large)   Pulse 61   Temp 97.7  F (36.5  C) (Temporal)   Resp 20   Ht 1.74 m (5' 8.5\")   Wt 88.9 kg (196 lb)   SpO2 99%   BMI 29.37 kg/m     Estimated body mass index is 29.37 kg/m  as calculated from the following:    Height as of this encounter: 1.74 m (5' 8.5\").    Weight as of this encounter: 88.9 kg (196 lb).    Physical Exam  GENERAL: alert and no distress  EYES: Eyes grossly normal to inspection, PERRL and conjunctivae and sclerae normal  HENT: ear canals and TM's normal, nose and mouth without ulcers or lesions  NECK: no adenopathy, no asymmetry, masses, or scars  RESP: lungs clear to auscultation - no rales, rhonchi or wheezes  CV: regular rate and rhythm, normal S1 S2, no S3 or S4, no murmur, click or rub, no peripheral edema  ABDOMEN: soft, nontender, no hepatosplenomegaly, no masses and bowel sounds normal  MS: no gross musculoskeletal defects noted, no edema  SKIN: no suspicious lesions or rashes  NEURO: Normal strength and tone, mentation intact and speech normal  PSYCH: mentation appears normal, affect normal/bright        4/9/2025   Mini Cog   Clock Draw Score 2 Normal   3 Item Recall 3 objects recalled   Mini Cog Total Score 5             Signed Electronically by: Jose L Eason MD    "

## 2025-04-09 NOTE — PATIENT INSTRUCTIONS
Patient Education   Preventive Care Advice   This is general advice given by our system to help you stay healthy. However, your care team may have specific advice just for you. Please talk to your care team about your preventive care needs.  Nutrition  Eat 5 or more servings of fruits and vegetables each day.  Try wheat bread, brown rice and whole grain pasta (instead of white bread, rice, and pasta).  Get enough calcium and vitamin D. Check the label on foods and aim for 100% of the RDA (recommended daily allowance).  Lifestyle  Exercise at least 150 minutes each week  (30 minutes a day, 5 days a week).  Do muscle strengthening activities 2 days a week. These help control your weight and prevent disease.  No smoking.  Wear sunscreen to prevent skin cancer.  Have a dental exam and cleaning every 6 months.  Yearly exams  See your health care team every year to talk about:  Any changes in your health.  Any medicines your care team has prescribed.  Preventive care, family planning, and ways to prevent chronic diseases.  Shots (vaccines)   HPV shots (up to age 26), if you've never had them before.  Hepatitis B shots (up to age 59), if you've never had them before.  COVID-19 shot: Get this shot when it's due.  Flu shot: Get a flu shot every year.  Tetanus shot: Get a tetanus shot every 10 years.  Pneumococcal, hepatitis A, and RSV shots: Ask your care team if you need these based on your risk.  Shingles shot (for age 50 and up)  General health tests  Diabetes screening:  Starting at age 35, Get screened for diabetes at least every 3 years.  If you are younger than age 35, ask your care team if you should be screened for diabetes.  Cholesterol test: At age 39, start having a cholesterol test every 5 years, or more often if advised.  Bone density scan (DEXA): At age 50, ask your care team if you should have this scan for osteoporosis (brittle bones).  Hepatitis C: Get tested at least once in your life.  STIs (sexually  transmitted infections)  Before age 24: Ask your care team if you should be screened for STIs.  After age 24: Get screened for STIs if you're at risk. You are at risk for STIs (including HIV) if:  You are sexually active with more than one person.  You don't use condoms every time.  You or a partner was diagnosed with a sexually transmitted infection.  If you are at risk for HIV, ask about PrEP medicine to prevent HIV.  Get tested for HIV at least once in your life, whether you are at risk for HIV or not.  Cancer screening tests  Cervical cancer screening: If you have a cervix, begin getting regular cervical cancer screening tests starting at age 21.  Breast cancer scan (mammogram): If you've ever had breasts, begin having regular mammograms starting at age 40. This is a scan to check for breast cancer.  Colon cancer screening: It is important to start screening for colon cancer at age 45.  Have a colonoscopy test every 10 years (or more often if you're at risk) Or, ask your provider about stool tests like a FIT test every year or Cologuard test every 3 years.  To learn more about your testing options, visit:   .  For help making a decision, visit:   https://bit.ly/nt51531.  Prostate cancer screening test: If you have a prostate, ask your care team if a prostate cancer screening test (PSA) at age 55 is right for you.  Lung cancer screening: If you are a current or former smoker ages 50 to 80, ask your care team if ongoing lung cancer screenings are right for you.  For informational purposes only. Not to replace the advice of your health care provider. Copyright   2023 [employer]. All rights reserved. Clinically reviewed by the [facility]. [employer] [id number] - REV 01/24.

## 2025-04-10 NOTE — RESULT ENCOUNTER NOTE
The following letter pertains to your most recent diagnostic tests:    -Your cholesterol panel looks healthy.     -Liver and gallbladder tests are normal for you. (ALT,AST, Alk phos, bilirubin), kidney function is normal for you (Creatinine, GFR), Sodium is normal, Potassium is normal for you, Calcium is normal for you, Glucose (blood sugar) is normal for you.      -Your prostate specific antigen (PSA) test result returned normal.     -Your hemoglobin A1c test which averages your blood sugars over the last 3 months returned normal.  No evidence for diabetes or prediabetes.       -Your complete blood counts including your hemoglobin returned normal for you.           Bottom line:  The lab results look healthy and at goal for you.  Schedule the CT scan to investigate your long-standing flank pain.       Sincerely,    Dr. Eason

## 2025-04-18 ENCOUNTER — HOSPITAL ENCOUNTER (OUTPATIENT)
Dept: CT IMAGING | Facility: CLINIC | Age: 70
Discharge: HOME | End: 2025-04-18
Attending: INTERNAL MEDICINE | Admitting: INTERNAL MEDICINE
Payer: COMMERCIAL

## 2025-04-18 DIAGNOSIS — R10.9 LEFT FLANK PAIN: ICD-10-CM

## 2025-04-18 PROCEDURE — 74176 CT ABD & PELVIS W/O CONTRAST: CPT

## 2025-08-18 ENCOUNTER — MYC MEDICAL ADVICE (OUTPATIENT)
Dept: FAMILY MEDICINE | Facility: CLINIC | Age: 70
End: 2025-08-18
Payer: COMMERCIAL

## 2025-08-18 DIAGNOSIS — E78.5 HYPERLIPIDEMIA LDL GOAL <130: ICD-10-CM

## 2025-08-19 RX ORDER — ATORVASTATIN CALCIUM 20 MG/1
20 TABLET, FILM COATED ORAL DAILY
Qty: 90 TABLET | Refills: 3 | Status: SHIPPED | OUTPATIENT
Start: 2025-08-19

## (undated) DEVICE — SOL WATER IRRIG 1000ML BOTTLE 2F7114

## (undated) RX ORDER — FENTANYL CITRATE 50 UG/ML
INJECTION, SOLUTION INTRAMUSCULAR; INTRAVENOUS
Status: DISPENSED
Start: 2020-01-31

## (undated) RX ORDER — FENTANYL CITRATE 50 UG/ML
INJECTION, SOLUTION INTRAMUSCULAR; INTRAVENOUS
Status: DISPENSED
Start: 2025-03-12